# Patient Record
Sex: FEMALE | Race: WHITE | Employment: FULL TIME | URBAN - METROPOLITAN AREA
[De-identification: names, ages, dates, MRNs, and addresses within clinical notes are randomized per-mention and may not be internally consistent; named-entity substitution may affect disease eponyms.]

---

## 2017-06-14 ENCOUNTER — TELEPHONE (OUTPATIENT)
Dept: INTERNAL MEDICINE CLINIC | Facility: CLINIC | Age: 47
End: 2017-06-14

## 2017-06-14 DIAGNOSIS — E55.9 VITAMIN D DEFICIENCY: ICD-10-CM

## 2017-06-14 DIAGNOSIS — Z00.00 ANNUAL PHYSICAL EXAM: Primary | ICD-10-CM

## 2017-06-14 DIAGNOSIS — E78.00 HYPERCHOLESTEROLEMIA: ICD-10-CM

## 2017-06-14 DIAGNOSIS — D50.0 IRON DEFICIENCY ANEMIA DUE TO CHRONIC BLOOD LOSS: ICD-10-CM

## 2017-06-14 RX ORDER — VENLAFAXINE HYDROCHLORIDE 75 MG/1
CAPSULE, EXTENDED RELEASE ORAL
Qty: 90 CAPSULE | Refills: 0 | Status: SHIPPED | OUTPATIENT
Start: 2017-06-14 | End: 2017-09-12

## 2017-06-14 NOTE — TELEPHONE ENCOUNTER
Lab order pending per protocol. To  to please review. Should Ferritin, iron and TIBC be added to lab order?

## 2017-06-14 NOTE — TELEPHONE ENCOUNTER
Pt scheduled for physical on 8/10/17  Pt will go for labs prior  Please be sure order in the system  Tasked to nursing

## 2017-06-26 RX ORDER — SIMVASTATIN 10 MG
TABLET ORAL
Qty: 90 TABLET | Refills: 0 | Status: SHIPPED | OUTPATIENT
Start: 2017-06-26 | End: 2017-09-24

## 2017-08-04 ENCOUNTER — APPOINTMENT (OUTPATIENT)
Dept: LAB | Age: 47
End: 2017-08-04
Attending: INTERNAL MEDICINE
Payer: COMMERCIAL

## 2017-08-04 LAB
ALT SERPL-CCNC: 17 U/L (ref 14–54)
ANION GAP SERPL CALC-SCNC: 6 MMOL/L (ref 0–18)
AST SERPL-CCNC: 25 U/L (ref 15–41)
BASOPHILS # BLD: 0.1 K/UL (ref 0–0.2)
BASOPHILS NFR BLD: 1 %
BUN SERPL-MCNC: 10 MG/DL (ref 8–20)
BUN/CREAT SERPL: 12.2 (ref 10–20)
CALCIUM SERPL-MCNC: 9.6 MG/DL (ref 8.5–10.5)
CHLORIDE SERPL-SCNC: 100 MMOL/L (ref 95–110)
CHOLEST SERPL-MCNC: 177 MG/DL (ref 110–200)
CO2 SERPL-SCNC: 31 MMOL/L (ref 22–32)
CREAT SERPL-MCNC: 0.82 MG/DL (ref 0.5–1.5)
EOSINOPHIL # BLD: 0.2 K/UL (ref 0–0.7)
EOSINOPHIL NFR BLD: 3 %
ERYTHROCYTE [DISTWIDTH] IN BLOOD BY AUTOMATED COUNT: 13.8 % (ref 11–15)
FERRITIN SERPL IA-MCNC: 63 NG/ML (ref 11–307)
GLUCOSE SERPL-MCNC: 99 MG/DL (ref 70–99)
HCT VFR BLD AUTO: 39.3 % (ref 35–48)
HDLC SERPL-MCNC: 56 MG/DL
HGB BLD-MCNC: 13.2 G/DL (ref 12–16)
IRON SATN MFR SERPL: 31 % (ref 15–50)
IRON SERPL-MCNC: 95 MCG/DL (ref 28–170)
LDLC SERPL CALC-MCNC: 94 MG/DL (ref 0–99)
LYMPHOCYTES # BLD: 1.3 K/UL (ref 1–4)
LYMPHOCYTES NFR BLD: 19 %
MCH RBC QN AUTO: 31.8 PG (ref 27–32)
MCHC RBC AUTO-ENTMCNC: 33.6 G/DL (ref 32–37)
MCV RBC AUTO: 94.5 FL (ref 80–100)
MONOCYTES # BLD: 0.5 K/UL (ref 0–1)
MONOCYTES NFR BLD: 8 %
NEUTROPHILS # BLD AUTO: 4.7 K/UL (ref 1.8–7.7)
NEUTROPHILS NFR BLD: 69 %
NONHDLC SERPL-MCNC: 121 MG/DL
OSMOLALITY UR CALC.SUM OF ELEC: 283 MOSM/KG (ref 275–295)
PLATELET # BLD AUTO: 277 K/UL (ref 140–400)
PMV BLD AUTO: 8.5 FL (ref 7.4–10.3)
POTASSIUM SERPL-SCNC: 4.2 MMOL/L (ref 3.3–5.1)
RBC # BLD AUTO: 4.16 M/UL (ref 3.7–5.4)
SODIUM SERPL-SCNC: 137 MMOL/L (ref 136–144)
TIBC SERPL-MCNC: 309 MCG/DL (ref 228–428)
TRANSFERRIN SERPL-MCNC: 234 MG/DL (ref 192–382)
TRIGL SERPL-MCNC: 136 MG/DL (ref 1–149)
TSH SERPL-ACNC: 1.67 UIU/ML (ref 0.45–5.33)
WBC # BLD AUTO: 6.8 K/UL (ref 4–11)

## 2017-08-04 PROCEDURE — 82728 ASSAY OF FERRITIN: CPT | Performed by: INTERNAL MEDICINE

## 2017-08-04 PROCEDURE — 85025 COMPLETE CBC W/AUTO DIFF WBC: CPT | Performed by: INTERNAL MEDICINE

## 2017-08-04 PROCEDURE — 83540 ASSAY OF IRON: CPT | Performed by: INTERNAL MEDICINE

## 2017-08-04 PROCEDURE — 84460 ALANINE AMINO (ALT) (SGPT): CPT | Performed by: INTERNAL MEDICINE

## 2017-08-04 PROCEDURE — 84450 TRANSFERASE (AST) (SGOT): CPT | Performed by: INTERNAL MEDICINE

## 2017-08-04 PROCEDURE — 80048 BASIC METABOLIC PNL TOTAL CA: CPT | Performed by: INTERNAL MEDICINE

## 2017-08-04 PROCEDURE — 84466 ASSAY OF TRANSFERRIN: CPT | Performed by: INTERNAL MEDICINE

## 2017-08-04 PROCEDURE — 82306 VITAMIN D 25 HYDROXY: CPT | Performed by: INTERNAL MEDICINE

## 2017-08-04 PROCEDURE — 80061 LIPID PANEL: CPT | Performed by: INTERNAL MEDICINE

## 2017-08-04 PROCEDURE — 84443 ASSAY THYROID STIM HORMONE: CPT | Performed by: INTERNAL MEDICINE

## 2017-08-07 LAB — 25(OH)D3 SERPL-MCNC: 35.4 NG/ML

## 2017-08-10 ENCOUNTER — OFFICE VISIT (OUTPATIENT)
Dept: INTERNAL MEDICINE CLINIC | Facility: CLINIC | Age: 47
End: 2017-08-10

## 2017-08-10 VITALS
HEIGHT: 63 IN | BODY MASS INDEX: 28.7 KG/M2 | DIASTOLIC BLOOD PRESSURE: 80 MMHG | SYSTOLIC BLOOD PRESSURE: 132 MMHG | TEMPERATURE: 98 F | WEIGHT: 162 LBS | HEART RATE: 72 BPM

## 2017-08-10 DIAGNOSIS — Z12.31 ENCOUNTER FOR SCREENING MAMMOGRAM FOR BREAST CANCER: ICD-10-CM

## 2017-08-10 DIAGNOSIS — E55.9 VITAMIN D DEFICIENCY: ICD-10-CM

## 2017-08-10 DIAGNOSIS — Z00.00 ROUTINE HEALTH MAINTENANCE: ICD-10-CM

## 2017-08-10 DIAGNOSIS — E78.00 HYPERCHOLESTEROLEMIA: Primary | ICD-10-CM

## 2017-08-10 PROCEDURE — 99396 PREV VISIT EST AGE 40-64: CPT | Performed by: INTERNAL MEDICINE

## 2017-08-10 NOTE — PROGRESS NOTES
Anna Cabral is a 51year old female. Patient presents with:  Physical: Patient is here today for a PE. She is due for pap exam in 2019. Patient states that she has been feeling good lately. No new issues.        HPI:   Anna Cabral is a 51year old disorder     per NG (EMA) : sinus tach symptomatic      Past Surgical History:  06-28-21013: COLONOSCOPY  No date: D & C  No date: OTHER SURGICAL HISTORY      Comment: complications after giving birth   Family History   Problem Relation Age of Onset   • Alton Carver given for repeat. Pap/HPV done in 4/2014 -- next in 2019. Continue exercise. Sees derm for annual exam.  Discussed healthy diet -- limit carbs.      2. Hypercholesterolemia  Lipids at goal on Zocor     3.  Iron deficiency anemia (heavy menses)  Menses st

## 2017-09-12 RX ORDER — VENLAFAXINE HYDROCHLORIDE 75 MG/1
CAPSULE, EXTENDED RELEASE ORAL
Qty: 90 CAPSULE | Refills: 3 | Status: SHIPPED | OUTPATIENT
Start: 2017-09-12 | End: 2018-09-09

## 2017-09-18 ENCOUNTER — HOSPITAL ENCOUNTER (OUTPATIENT)
Dept: MAMMOGRAPHY | Age: 47
Discharge: HOME | End: 2017-09-18
Attending: INTERNAL MEDICINE
Payer: COMMERCIAL

## 2017-09-18 DIAGNOSIS — Z12.31 ENCOUNTER FOR SCREENING MAMMOGRAM FOR BREAST CANCER: ICD-10-CM

## 2017-09-18 PROCEDURE — 77067 SCR MAMMO BI INCL CAD: CPT | Performed by: INTERNAL MEDICINE

## 2017-09-25 RX ORDER — SIMVASTATIN 10 MG
TABLET ORAL
Qty: 90 TABLET | Refills: 3 | Status: SHIPPED | OUTPATIENT
Start: 2017-09-25 | End: 2018-09-20

## 2018-03-10 ENCOUNTER — HOSPITAL ENCOUNTER (OUTPATIENT)
Age: 48
Discharge: HOME | End: 2018-03-10
Attending: FAMILY MEDICINE
Payer: COMMERCIAL

## 2018-03-10 VITALS
WEIGHT: 165 LBS | SYSTOLIC BLOOD PRESSURE: 137 MMHG | DIASTOLIC BLOOD PRESSURE: 88 MMHG | OXYGEN SATURATION: 96 % | HEIGHT: 62 IN | HEART RATE: 74 BPM | BODY MASS INDEX: 30.36 KG/M2 | TEMPERATURE: 99 F | RESPIRATION RATE: 18 BRPM

## 2018-03-10 DIAGNOSIS — J11.1 INFLUENZA: Primary | ICD-10-CM

## 2018-03-10 LAB — S PYO AG THROAT QL: NEGATIVE

## 2018-03-10 PROCEDURE — 99204 OFFICE O/P NEW MOD 45 MIN: CPT

## 2018-03-10 PROCEDURE — 87430 STREP A AG IA: CPT

## 2018-03-10 PROCEDURE — 99203 OFFICE O/P NEW LOW 30 MIN: CPT

## 2018-03-10 RX ORDER — OSELTAMIVIR PHOSPHATE 75 MG/1
75 CAPSULE ORAL 2 TIMES DAILY
Qty: 10 CAPSULE | Refills: 0 | Status: SHIPPED | OUTPATIENT
Start: 2018-03-10 | End: 2018-03-15

## 2018-03-10 NOTE — ED INITIAL ASSESSMENT (HPI)
REPORTS COUGH, CHILLS, BODY ACHES SINCE Thursday EVENING. PATIENT ALSO C/O HEADACHE AND EAR CONGESTION. DENIES FEVERS AT HOME. HOWEVER STATES  DIAGNOSED WITH \"THE FLU\" EARLIER THIS WEEK AND TESTED POSITIVE FOR INFLUENZA A.

## 2018-03-10 NOTE — ED PROVIDER NOTES
Patient presents with:  Cough/URI    HPI:     Therese Foster is a 49year old female who presents with for chief complaint of fevers, chills, chest congestion, cough, headaches and body aches.   Onset of symptoms was 1 day  The patient denies complaints o 40's       Smoking status: Former Smoker                                                              Packs/day: 0.00      Years: 0.00      Smokeless tobacco: Never Used                      Alcohol use: Yes           1.5 oz/week     Glasses of wine: 3 per total) by mouth 2 (two) times daily. Dispense:  10 capsule          Refill:  0    Steam inhalation. Monitor fluid intake and urine output. Push fluids.    Motrin/tylenol for fever  OTC cough/cold meds for symptomatic relief  Monitor for worsening

## 2018-06-22 ENCOUNTER — LAB ENCOUNTER (OUTPATIENT)
Dept: LAB | Age: 48
End: 2018-06-22
Attending: ALLERGY & IMMUNOLOGY
Payer: COMMERCIAL

## 2018-06-22 DIAGNOSIS — L50.9 HIVES: Primary | ICD-10-CM

## 2018-06-22 PROCEDURE — 86160 COMPLEMENT ANTIGEN: CPT

## 2018-06-22 PROCEDURE — 85652 RBC SED RATE AUTOMATED: CPT

## 2018-06-22 PROCEDURE — 86332 IMMUNE COMPLEX ASSAY: CPT

## 2018-06-22 PROCEDURE — 81001 URINALYSIS AUTO W/SCOPE: CPT

## 2018-06-22 PROCEDURE — 86038 ANTINUCLEAR ANTIBODIES: CPT

## 2018-06-22 PROCEDURE — 86431 RHEUMATOID FACTOR QUANT: CPT

## 2018-06-22 PROCEDURE — 84443 ASSAY THYROID STIM HORMONE: CPT

## 2018-06-22 PROCEDURE — 80053 COMPREHEN METABOLIC PANEL: CPT

## 2018-06-22 PROCEDURE — 86162 COMPLEMENT TOTAL (CH50): CPT

## 2018-06-22 PROCEDURE — 36415 COLL VENOUS BLD VENIPUNCTURE: CPT

## 2018-06-22 PROCEDURE — 84439 ASSAY OF FREE THYROXINE: CPT

## 2018-06-27 ENCOUNTER — LAB ENCOUNTER (OUTPATIENT)
Dept: LAB | Age: 48
End: 2018-06-27
Attending: ALLERGY & IMMUNOLOGY
Payer: COMMERCIAL

## 2018-06-27 DIAGNOSIS — L50.9 HIVES: ICD-10-CM

## 2018-06-27 PROCEDURE — 36415 COLL VENOUS BLD VENIPUNCTURE: CPT

## 2018-06-27 PROCEDURE — 85025 COMPLETE CBC W/AUTO DIFF WBC: CPT

## 2018-07-26 ENCOUNTER — HOSPITAL ENCOUNTER (OUTPATIENT)
Dept: MAMMOGRAPHY | Age: 48
Discharge: HOME | End: 2018-07-26
Attending: INTERNAL MEDICINE
Payer: COMMERCIAL

## 2018-07-26 ENCOUNTER — LAB ENCOUNTER (OUTPATIENT)
Dept: LAB | Age: 48
End: 2018-07-26
Attending: INTERNAL MEDICINE
Payer: COMMERCIAL

## 2018-07-26 DIAGNOSIS — Z12.31 ENCOUNTER FOR SCREENING MAMMOGRAM FOR BREAST CANCER: ICD-10-CM

## 2018-07-26 DIAGNOSIS — L50.9 HIVES: Primary | ICD-10-CM

## 2018-07-26 LAB
BACTERIA UR QL AUTO: NEGATIVE /HPF
BILIRUB UR QL: NEGATIVE
CLARITY UR: CLEAR
COLOR UR: YELLOW
GLUCOSE UR-MCNC: NEGATIVE MG/DL
KETONES UR-MCNC: NEGATIVE MG/DL
LEUKOCYTE ESTERASE UR QL STRIP.AUTO: NEGATIVE
NITRITE UR QL STRIP.AUTO: NEGATIVE
PH UR: 5 [PH] (ref 5–8)
PROT UR-MCNC: NEGATIVE MG/DL
RBC #/AREA URNS AUTO: <1 /HPF
SP GR UR STRIP: 1.01 (ref 1–1.03)
UROBILINOGEN UR STRIP-ACNC: <2
VIT C UR-MCNC: NEGATIVE MG/DL
WBC #/AREA URNS AUTO: <1 /HPF

## 2018-07-26 PROCEDURE — 81001 URINALYSIS AUTO W/SCOPE: CPT

## 2018-07-26 PROCEDURE — 77067 SCR MAMMO BI INCL CAD: CPT | Performed by: INTERNAL MEDICINE

## 2018-08-16 ENCOUNTER — OFFICE VISIT (OUTPATIENT)
Dept: INTERNAL MEDICINE CLINIC | Facility: CLINIC | Age: 48
End: 2018-08-16
Payer: COMMERCIAL

## 2018-08-16 VITALS
DIASTOLIC BLOOD PRESSURE: 78 MMHG | HEIGHT: 62 IN | SYSTOLIC BLOOD PRESSURE: 130 MMHG | HEART RATE: 68 BPM | BODY MASS INDEX: 30.91 KG/M2 | TEMPERATURE: 98 F | WEIGHT: 168 LBS

## 2018-08-16 DIAGNOSIS — Z83.71 FAMILY HISTORY OF COLONIC POLYPS: ICD-10-CM

## 2018-08-16 DIAGNOSIS — E78.00 HYPERCHOLESTEROLEMIA: ICD-10-CM

## 2018-08-16 DIAGNOSIS — Z12.4 SCREENING FOR CERVICAL CANCER: ICD-10-CM

## 2018-08-16 DIAGNOSIS — E55.9 VITAMIN D DEFICIENCY: ICD-10-CM

## 2018-08-16 DIAGNOSIS — Z00.00 ROUTINE HEALTH MAINTENANCE: Primary | ICD-10-CM

## 2018-08-16 PROCEDURE — 99396 PREV VISIT EST AGE 40-64: CPT | Performed by: INTERNAL MEDICINE

## 2018-08-16 NOTE — PROGRESS NOTES
Gerry Aranda is a 49year old female. Patient presents with:  Physical      HPI:   Gerry Aranda is a 49year old female who presents for a complete physical exam.   Has been seeing allergist Dr. Purnima kaplan. Had labs done.   Plays tennis and does Cancer Other    • Arthritis Other    • Heart Disease Other    • Neurological Disorder Other    • Breast Cancer Paternal Aunt      63's   • Breast Cancer Maternal Cousin Female      42's      Social History:   Smoking status: Former Smoker Family hx of colon polyps (dad) and colon cancer (paternal aunt)  Pt had colonoscopy 6/28/13 (no polyps) and recently 7/25/18 (no polyps) with Dr. Esteban Hernandez. 5. Chronic constipation  Dr. Esteban Hernandez added miralax to her Benefiber.     RTC 1 yr.       Merlinda Sorenson

## 2018-08-20 LAB — HPV I/H RISK 1 DNA SPEC QL NAA+PROBE: NEGATIVE

## 2018-09-10 NOTE — TELEPHONE ENCOUNTER
Dr Nasir Anderson do you want Pt to continue with Med?  No mention in last OV note, just checking, thanks

## 2018-09-12 RX ORDER — VENLAFAXINE HYDROCHLORIDE 75 MG/1
CAPSULE, EXTENDED RELEASE ORAL
Qty: 90 CAPSULE | Refills: 3 | Status: SHIPPED | OUTPATIENT
Start: 2018-09-12 | End: 2019-08-22

## 2018-09-20 RX ORDER — SIMVASTATIN 10 MG
TABLET ORAL
Qty: 90 TABLET | Refills: 0 | Status: SHIPPED | OUTPATIENT
Start: 2018-09-20 | End: 2018-12-18

## 2018-11-06 ENCOUNTER — MED REC SCAN ONLY (OUTPATIENT)
Dept: INTERNAL MEDICINE CLINIC | Facility: CLINIC | Age: 48
End: 2018-11-06

## 2018-12-06 ENCOUNTER — TELEPHONE (OUTPATIENT)
Dept: INTERNAL MEDICINE CLINIC | Facility: CLINIC | Age: 48
End: 2018-12-06

## 2018-12-07 NOTE — TELEPHONE ENCOUNTER
Please let pt know that I received the UA results from her allergist Dr. Melvi Russell. While the dipstick was positive for a small amount of blood, the microscopic portion only showed 0-2 RBC's, which is normal.  No further w/u needed.

## 2018-12-07 NOTE — TELEPHONE ENCOUNTER
Left voicemail relaying the message per HIPAA. Encouraged the patient to call back with any questions or concerns.

## 2018-12-15 ENCOUNTER — APPOINTMENT (OUTPATIENT)
Dept: LAB | Age: 48
End: 2018-12-15
Attending: INTERNAL MEDICINE
Payer: COMMERCIAL

## 2018-12-15 DIAGNOSIS — E55.9 VITAMIN D DEFICIENCY: ICD-10-CM

## 2018-12-15 DIAGNOSIS — E78.00 HYPERCHOLESTEROLEMIA: ICD-10-CM

## 2018-12-15 PROCEDURE — 36415 COLL VENOUS BLD VENIPUNCTURE: CPT

## 2018-12-15 PROCEDURE — 80061 LIPID PANEL: CPT

## 2018-12-15 PROCEDURE — 82306 VITAMIN D 25 HYDROXY: CPT

## 2018-12-19 RX ORDER — SIMVASTATIN 10 MG
10 TABLET ORAL NIGHTLY
Qty: 90 TABLET | Refills: 3 | Status: SHIPPED | OUTPATIENT
Start: 2018-12-19 | End: 2019-08-22

## 2019-05-30 NOTE — TELEPHONE ENCOUNTER
#90 with 3 sent to [facility] 9/12/18    Refill request received from [facility] [place]. LMTCB: please ask pt if she would like [facility] or new rx to [facility]?

## 2019-06-10 RX ORDER — VENLAFAXINE HYDROCHLORIDE 75 MG/1
CAPSULE, EXTENDED RELEASE ORAL
Qty: 90 CAPSULE | Refills: 0 | OUTPATIENT
Start: 2019-06-10

## 2019-07-31 ENCOUNTER — TELEPHONE (OUTPATIENT)
Dept: INTERNAL MEDICINE CLINIC | Facility: CLINIC | Age: 49
End: 2019-07-31

## 2019-07-31 DIAGNOSIS — Z12.39 SCREENING FOR BREAST CANCER: ICD-10-CM

## 2019-07-31 DIAGNOSIS — E78.00 HYPERCHOLESTEROLEMIA: ICD-10-CM

## 2019-07-31 DIAGNOSIS — R53.83 OTHER FATIGUE: ICD-10-CM

## 2019-07-31 DIAGNOSIS — D50.0 IRON DEFICIENCY ANEMIA DUE TO CHRONIC BLOOD LOSS: ICD-10-CM

## 2019-07-31 DIAGNOSIS — Z00.00 ANNUAL PHYSICAL EXAM: Primary | ICD-10-CM

## 2019-07-31 NOTE — TELEPHONE ENCOUNTER
Please advise on labs for annual physical , also order for mammogram thanks - to DR. Christine Connors

## 2019-07-31 NOTE — TELEPHONE ENCOUNTER
Pt is scheduled on 8/22 for a yearly physical, pt is asking for an order for lab work & a mammogram  Please call pt when complete [phone]    Tasked to nursing

## 2019-08-01 NOTE — TELEPHONE ENCOUNTER
Called patient and relayed on VM per hipaa that fasting lab orders placed, fast for 12 hours prior, water ok. Also relayed that mammogram order placed and gave phone number for central scheduling. Instructed to call back if any further questions.

## 2019-08-10 ENCOUNTER — LAB ENCOUNTER (OUTPATIENT)
Dept: LAB | Age: 49
End: 2019-08-10
Attending: INTERNAL MEDICINE
Payer: COMMERCIAL

## 2019-08-10 DIAGNOSIS — R53.83 OTHER FATIGUE: ICD-10-CM

## 2019-08-10 DIAGNOSIS — E78.00 HYPERCHOLESTEROLEMIA: ICD-10-CM

## 2019-08-10 DIAGNOSIS — Z00.00 ANNUAL PHYSICAL EXAM: ICD-10-CM

## 2019-08-10 LAB
ALBUMIN SERPL-MCNC: 3.9 G/DL (ref 3.4–5)
ALBUMIN/GLOB SERPL: 1.1 {RATIO} (ref 1–2)
ALP LIVER SERPL-CCNC: 55 U/L (ref 39–100)
ALT SERPL-CCNC: 20 U/L (ref 13–56)
ANION GAP SERPL CALC-SCNC: 7 MMOL/L (ref 0–18)
AST SERPL-CCNC: 21 U/L (ref 15–37)
BASOPHILS # BLD AUTO: 0.06 X10(3) UL (ref 0–0.2)
BASOPHILS NFR BLD AUTO: 1.1 %
BILIRUB SERPL-MCNC: 0.5 MG/DL (ref 0.1–2)
BUN BLD-MCNC: 16 MG/DL (ref 7–18)
BUN/CREAT SERPL: 21.9 (ref 10–20)
CALCIUM BLD-MCNC: 9.4 MG/DL (ref 8.5–10.1)
CHLORIDE SERPL-SCNC: 104 MMOL/L (ref 98–112)
CHOLEST SMN-MCNC: 221 MG/DL (ref ?–200)
CO2 SERPL-SCNC: 29 MMOL/L (ref 21–32)
CREAT BLD-MCNC: 0.73 MG/DL (ref 0.55–1.02)
DEPRECATED RDW RBC AUTO: 47.5 FL (ref 35.1–46.3)
EOSINOPHIL # BLD AUTO: 0.35 X10(3) UL (ref 0–0.7)
EOSINOPHIL NFR BLD AUTO: 6.3 %
ERYTHROCYTE [DISTWIDTH] IN BLOOD BY AUTOMATED COUNT: 13.2 % (ref 11–15)
GLOBULIN PLAS-MCNC: 3.6 G/DL (ref 2.8–4.4)
GLUCOSE BLD-MCNC: 95 MG/DL (ref 70–99)
HCT VFR BLD AUTO: 41.4 % (ref 35–48)
HDLC SERPL-MCNC: 68 MG/DL (ref 40–59)
HGB BLD-MCNC: 13.5 G/DL (ref 12–16)
IMM GRANULOCYTES # BLD AUTO: 0.04 X10(3) UL (ref 0–1)
IMM GRANULOCYTES NFR BLD: 0.7 %
LDLC SERPL CALC-MCNC: 136 MG/DL (ref ?–100)
LYMPHOCYTES # BLD AUTO: 1.53 X10(3) UL (ref 1–4)
LYMPHOCYTES NFR BLD AUTO: 27.3 %
M PROTEIN MFR SERPL ELPH: 7.5 G/DL (ref 6.4–8.2)
MCH RBC QN AUTO: 31.3 PG (ref 26–34)
MCHC RBC AUTO-ENTMCNC: 32.6 G/DL (ref 31–37)
MCV RBC AUTO: 95.8 FL (ref 80–100)
MONOCYTES # BLD AUTO: 0.65 X10(3) UL (ref 0.1–1)
MONOCYTES NFR BLD AUTO: 11.6 %
NEUTROPHILS # BLD AUTO: 2.97 X10 (3) UL (ref 1.5–7.7)
NEUTROPHILS # BLD AUTO: 2.97 X10(3) UL (ref 1.5–7.7)
NEUTROPHILS NFR BLD AUTO: 53 %
NONHDLC SERPL-MCNC: 153 MG/DL (ref ?–130)
OSMOLALITY SERPL CALC.SUM OF ELEC: 291 MOSM/KG (ref 275–295)
PATIENT FASTING: YES
PATIENT FASTING: YES
PLATELET # BLD AUTO: 300 10(3)UL (ref 150–450)
POTASSIUM SERPL-SCNC: 4.3 MMOL/L (ref 3.5–5.1)
RBC # BLD AUTO: 4.32 X10(6)UL (ref 3.8–5.3)
SODIUM SERPL-SCNC: 140 MMOL/L (ref 136–145)
TRIGL SERPL-MCNC: 83 MG/DL (ref 30–149)
TSI SER-ACNC: 3.21 MIU/ML (ref 0.36–3.74)
VLDLC SERPL CALC-MCNC: 17 MG/DL (ref 0–30)
WBC # BLD AUTO: 5.6 X10(3) UL (ref 4–11)

## 2019-08-10 PROCEDURE — 80061 LIPID PANEL: CPT

## 2019-08-10 PROCEDURE — 36415 COLL VENOUS BLD VENIPUNCTURE: CPT

## 2019-08-10 PROCEDURE — 84443 ASSAY THYROID STIM HORMONE: CPT

## 2019-08-10 PROCEDURE — 80053 COMPREHEN METABOLIC PANEL: CPT

## 2019-08-10 PROCEDURE — 85025 COMPLETE CBC W/AUTO DIFF WBC: CPT

## 2019-08-22 ENCOUNTER — OFFICE VISIT (OUTPATIENT)
Dept: INTERNAL MEDICINE CLINIC | Facility: CLINIC | Age: 49
End: 2019-08-22
Payer: COMMERCIAL

## 2019-08-22 VITALS
TEMPERATURE: 98 F | BODY MASS INDEX: 27.97 KG/M2 | DIASTOLIC BLOOD PRESSURE: 78 MMHG | HEIGHT: 62 IN | HEART RATE: 54 BPM | RESPIRATION RATE: 16 BRPM | SYSTOLIC BLOOD PRESSURE: 116 MMHG | WEIGHT: 152 LBS | OXYGEN SATURATION: 99 %

## 2019-08-22 DIAGNOSIS — Z78.0 POSTMENOPAUSE: Primary | ICD-10-CM

## 2019-08-22 DIAGNOSIS — E78.00 HYPERCHOLESTEROLEMIA: ICD-10-CM

## 2019-08-22 DIAGNOSIS — Z00.00 ROUTINE HEALTH MAINTENANCE: ICD-10-CM

## 2019-08-22 DIAGNOSIS — E55.9 VITAMIN D DEFICIENCY: ICD-10-CM

## 2019-08-22 DIAGNOSIS — Z83.71 FAMILY HISTORY OF COLONIC POLYPS: ICD-10-CM

## 2019-08-22 PROCEDURE — 99396 PREV VISIT EST AGE 40-64: CPT | Performed by: INTERNAL MEDICINE

## 2019-08-22 PROCEDURE — 90715 TDAP VACCINE 7 YRS/> IM: CPT | Performed by: INTERNAL MEDICINE

## 2019-08-22 PROCEDURE — 90471 IMMUNIZATION ADMIN: CPT | Performed by: INTERNAL MEDICINE

## 2019-08-22 RX ORDER — SIMVASTATIN 10 MG
TABLET ORAL
Qty: 90 TABLET | Refills: 0 | OUTPATIENT
Start: 2019-08-22

## 2019-08-22 RX ORDER — SIMVASTATIN 10 MG
10 TABLET ORAL NIGHTLY
Qty: 90 TABLET | Refills: 3 | Status: SHIPPED | OUTPATIENT
Start: 2019-08-22 | End: 2020-09-15

## 2019-08-22 RX ORDER — VENLAFAXINE HYDROCHLORIDE 75 MG/1
75 CAPSULE, EXTENDED RELEASE ORAL
Qty: 90 CAPSULE | Refills: 3 | Status: SHIPPED | OUTPATIENT
Start: 2019-08-22 | End: 2019-11-26

## 2019-08-22 RX ORDER — VENLAFAXINE HYDROCHLORIDE 75 MG/1
CAPSULE, EXTENDED RELEASE ORAL
Qty: 90 CAPSULE | Refills: 0 | OUTPATIENT
Start: 2019-08-22

## 2019-08-22 NOTE — PROGRESS NOTES
Yvonne Mary is a 51year old female. Patient presents with:  Physical: Annual Px      HPI:   Yvonne Mary is a 51year old female who presents for a complete physical exam.     Doing keto diet -- has lost 20 lbs thus far over the past few months. Other    • Breast Cancer Paternal Aunt         63's   • Breast Cancer Maternal Cousin Female         42's      Social History:   Social History    Tobacco Use      Smoking status: Former Smoker      Smokeless tobacco: Never Used    Alcohol use:  Yes      Al polyps) and again on 7/25/18 (no polyps) with Dr. Eulalia Andres. Chronic constipation  Has seen GI, Dr. Eulalia Andres; sx improved with keto diet. Now just takes miralax and benefiber as needed.     RTC 1 yr.       Jacolyn Habermann, 08/22/19, 4:29 PM

## 2019-08-26 ENCOUNTER — HOSPITAL ENCOUNTER (OUTPATIENT)
Dept: MAMMOGRAPHY | Age: 49
Discharge: HOME | End: 2019-08-26
Attending: INTERNAL MEDICINE
Payer: COMMERCIAL

## 2019-08-26 DIAGNOSIS — Z12.39 SCREENING FOR BREAST CANCER: ICD-10-CM

## 2019-08-26 PROCEDURE — 77067 SCR MAMMO BI INCL CAD: CPT | Performed by: INTERNAL MEDICINE

## 2019-08-26 PROCEDURE — 77063 BREAST TOMOSYNTHESIS BI: CPT | Performed by: INTERNAL MEDICINE

## 2019-09-10 ENCOUNTER — HOSPITAL ENCOUNTER (OUTPATIENT)
Dept: ULTRASOUND IMAGING | Facility: HOSPITAL | Age: 49
Discharge: HOME | End: 2019-09-10
Attending: INTERNAL MEDICINE
Payer: COMMERCIAL

## 2019-09-10 ENCOUNTER — HOSPITAL ENCOUNTER (OUTPATIENT)
Dept: MAMMOGRAPHY | Facility: HOSPITAL | Age: 49
Discharge: HOME | End: 2019-09-10
Attending: INTERNAL MEDICINE
Payer: COMMERCIAL

## 2019-09-10 DIAGNOSIS — R92.8 ABNORMAL MAMMOGRAM: ICD-10-CM

## 2019-09-10 PROCEDURE — 77061 BREAST TOMOSYNTHESIS UNI: CPT | Performed by: INTERNAL MEDICINE

## 2019-09-10 PROCEDURE — 76642 ULTRASOUND BREAST LIMITED: CPT | Performed by: INTERNAL MEDICINE

## 2019-09-10 PROCEDURE — 77065 DX MAMMO INCL CAD UNI: CPT | Performed by: INTERNAL MEDICINE

## 2019-09-10 NOTE — IMAGING NOTE
This nurse introduced self and role of breast coordinator. Discussed recommended breast biopsy with patient. Pt was recommended by Dr Lynsey Jon to have a right breast ultrasound guided biopsy. She is  has 2 children 12 and 11.  Allergic to contrast DIAGNOSTIC CATEGORY 4A--SUSPICIOUS  (low suspicion for malignancy)     RECOMMENDATIONS:   ULTRASOUND-GUIDED CORE BIOPSY: RIGHT BREAST                         Dictated by (CST): Caroline Mack MD on 9/10/2019 at 15:32       Approved by (CST): Mynor Davis for this procedure. The US technician will use the ultrasound machine to locate the area in question that was seen on your previous breast imaging. The Radiologist will then inject a local numbing medication into the area.  This may burn and sting for sev communicated by their ordering physician unless otherwise indicated. Educated patient that once we receive an order from her physician  our radiology secretaries would be calling   to schedule the biopsy.

## 2019-09-11 ENCOUNTER — TELEPHONE (OUTPATIENT)
Dept: INTERNAL MEDICINE CLINIC | Facility: CLINIC | Age: 49
End: 2019-09-11

## 2019-09-11 NOTE — TELEPHONE ENCOUNTER
LM on pt's voicemail re mammo results -- radiologist apparently already discussed recs for biopsy with pt.   Order for bx faxed to radiology dept

## 2019-09-11 NOTE — TELEPHONE ENCOUNTER
Routed to Dr Octavio Ernandez - Please review [facility] Fax sent to us regarding Marysol's recent Mammogram (On your desk :))

## 2019-09-18 ENCOUNTER — HOSPITAL ENCOUNTER (OUTPATIENT)
Dept: ULTRASOUND IMAGING | Facility: HOSPITAL | Age: 49
Discharge: HOME | End: 2019-09-18
Attending: INTERNAL MEDICINE
Payer: COMMERCIAL

## 2019-09-18 ENCOUNTER — HOSPITAL ENCOUNTER (OUTPATIENT)
Dept: MAMMOGRAPHY | Facility: HOSPITAL | Age: 49
Discharge: HOME | End: 2019-09-18
Attending: INTERNAL MEDICINE
Payer: COMMERCIAL

## 2019-09-18 VITALS — DIASTOLIC BLOOD PRESSURE: 74 MMHG | HEART RATE: 60 BPM | SYSTOLIC BLOOD PRESSURE: 121 MMHG

## 2019-09-18 DIAGNOSIS — N63.10 BREAST MASS, RIGHT: ICD-10-CM

## 2019-09-18 PROCEDURE — 88305 TISSUE EXAM BY PATHOLOGIST: CPT | Performed by: INTERNAL MEDICINE

## 2019-09-18 PROCEDURE — 88360 TUMOR IMMUNOHISTOCHEM/MANUAL: CPT | Performed by: INTERNAL MEDICINE

## 2019-09-18 PROCEDURE — 77065 DX MAMMO INCL CAD UNI: CPT | Performed by: INTERNAL MEDICINE

## 2019-09-18 PROCEDURE — 19083 BX BREAST 1ST LESION US IMAG: CPT | Performed by: INTERNAL MEDICINE

## 2019-09-18 NOTE — PROCEDURES
[facility]  Procedure Note    [place] Patient Status:  Outpatient    10/17/1970 MRN [id number]   Location [address] [facility] Attending Freddie Scott MD   Hosp Day # 0 PCP Robinson Cervantes MD     Procedure:

## 2019-09-19 ENCOUNTER — TELEPHONE (OUTPATIENT)
Dept: INTERNAL MEDICINE CLINIC | Facility: CLINIC | Age: 49
End: 2019-09-19

## 2019-09-19 NOTE — IMAGING NOTE
8770:  Attempt to contact Mrs. Flaco Christie at this time: follow-up post Ultrasound Guided Right Breast Biopsy. Voice message left requesting return call.

## 2019-09-20 NOTE — TELEPHONE ENCOUNTER
Please call pt to ensure she got my Mychart message about her benign breast biopsy and repeat mammo in 6 mos.

## 2019-11-26 ENCOUNTER — TELEPHONE (OUTPATIENT)
Dept: INTERNAL MEDICINE CLINIC | Facility: CLINIC | Age: 49
End: 2019-11-26

## 2019-11-26 RX ORDER — VENLAFAXINE HYDROCHLORIDE 75 MG/1
75 CAPSULE, EXTENDED RELEASE ORAL
Qty: 30 CAPSULE | Refills: 0 | Status: SHIPPED | OUTPATIENT
Start: 2019-11-26 | End: 2019-12-30

## 2019-11-26 NOTE — TELEPHONE ENCOUNTER
Pt is calling to request a 30 day supply of Venlafaxine 75 mg  There was a mix up with the auto refill at her mail order, they will be getting the medication to her but it will take time  Pt is out of medication    Tasked to rx low

## 2019-11-26 NOTE — TELEPHONE ENCOUNTER
Short term fill requested to [facility].     Refill request is for a maintenance medication and has met the criteria specified in the Ambulatory Medication Refill Standing Order for eligibility, visits, laboratory, alerts and was sent to the requested pharma

## 2019-12-23 RX ORDER — VENLAFAXINE HYDROCHLORIDE 75 MG/1
CAPSULE, EXTENDED RELEASE ORAL
Qty: 30 CAPSULE | Refills: 0 | OUTPATIENT
Start: 2019-12-23

## 2019-12-23 NOTE — TELEPHONE ENCOUNTER
Current refill request refused due to refill is either a duplicate request or has active refills at the pharmacy. Check previous templates.     Requested Prescriptions     Refused Prescriptions Disp Refills   • VENLAFAXINE HCL ER 75 MG Oral Capsule SR 24 H

## 2019-12-30 ENCOUNTER — TELEPHONE (OUTPATIENT)
Dept: INTERNAL MEDICINE CLINIC | Facility: CLINIC | Age: 49
End: 2019-12-30

## 2019-12-30 RX ORDER — VENLAFAXINE HYDROCHLORIDE 75 MG/1
75 CAPSULE, EXTENDED RELEASE ORAL
Qty: 30 CAPSULE | Refills: 0 | Status: SHIPPED | OUTPATIENT
Start: 2019-12-30 | End: 2020-07-29

## 2019-12-30 NOTE — TELEPHONE ENCOUNTER
Pt requesting refill for:  Venlafaxine, needs short term, mail order has not arrived yet  Mail order should arrive later this week  Pt uses [facility]  Tasked to [employer]

## 2020-01-02 ENCOUNTER — HOSPITAL ENCOUNTER (OUTPATIENT)
Age: 50
Discharge: HOME | End: 2020-01-02
Attending: EMERGENCY MEDICINE
Payer: COMMERCIAL

## 2020-01-02 VITALS
SYSTOLIC BLOOD PRESSURE: 135 MMHG | RESPIRATION RATE: 18 BRPM | OXYGEN SATURATION: 98 % | HEART RATE: 68 BPM | DIASTOLIC BLOOD PRESSURE: 74 MMHG | BODY MASS INDEX: 27 KG/M2 | TEMPERATURE: 99 F | WEIGHT: 145 LBS

## 2020-01-02 DIAGNOSIS — J02.0 STREP PHARYNGITIS: Primary | ICD-10-CM

## 2020-01-02 LAB — S PYO AG THROAT QL: POSITIVE

## 2020-01-02 PROCEDURE — 99214 OFFICE O/P EST MOD 30 MIN: CPT

## 2020-01-02 PROCEDURE — 99213 OFFICE O/P EST LOW 20 MIN: CPT

## 2020-01-02 PROCEDURE — 87430 STREP A AG IA: CPT

## 2020-01-02 RX ORDER — AMOXICILLIN 875 MG/1
875 TABLET, COATED ORAL 2 TIMES DAILY
Qty: 20 TABLET | Refills: 0 | Status: SHIPPED | OUTPATIENT
Start: 2020-01-02 | End: 2020-01-12

## 2020-01-02 NOTE — ED PROVIDER NOTES
Patient Seen in: [facility]      History   Patient presents with:  Sore Throat    Stated Complaint: SORE THROAT COUGH    HPI  Sore throat for 2 days. No fevers or chills. No chest pain shortness of breath.   No headache membrane and external ear normal.      Left Ear: Tympanic membrane and external ear normal.      Nose: No congestion or rhinorrhea. Mouth/Throat:      Mouth: Mucous membranes are moist.      Pharynx: Posterior oropharyngeal erythema present.  No oropha 0

## 2020-01-13 ENCOUNTER — TELEPHONE (OUTPATIENT)
Dept: INTERNAL MEDICINE CLINIC | Facility: CLINIC | Age: 50
End: 2020-01-13

## 2020-01-13 NOTE — TELEPHONE ENCOUNTER
----- Message from Sherle Sever sent at 1/13/2020  9:25 AM CST -----  Regarding: Other  Contact: [phone]  Dr. Alvaro Perales,     I am applying for [facility] to stay home with my mom, Suresh Marquez.   I know you're also completing one for my brother, Devang Todd

## 2020-01-15 ENCOUNTER — PATIENT MESSAGE (OUTPATIENT)
Dept: INTERNAL MEDICINE CLINIC | Facility: CLINIC | Age: 50
End: 2020-01-15

## 2020-01-15 ENCOUNTER — MED REC SCAN ONLY (OUTPATIENT)
Dept: INTERNAL MEDICINE CLINIC | Facility: CLINIC | Age: 50
End: 2020-01-15

## 2020-01-15 NOTE — TELEPHONE ENCOUNTER
From: Karyn Little  To: Salvador Addison MD  Sent: 1/15/2020 11:29 AM CST  Subject: Other    Dr. Landon Grider,    I got your message. Can you write a short letter on letterhead with my mom's diagnosis and the reason for hospice?  I think that should be enou

## 2020-01-15 NOTE — TELEPHONE ENCOUNTER
Jerson Moran and asked her to clarify what type of documentation is needed -- eg, progress note, letter, etc

## 2020-01-27 RX ORDER — VENLAFAXINE HYDROCHLORIDE 75 MG/1
CAPSULE, EXTENDED RELEASE ORAL
Qty: 30 CAPSULE | Refills: 0 | OUTPATIENT
Start: 2020-01-27

## 2020-01-27 NOTE — TELEPHONE ENCOUNTER
Refilled for a year supply to [facility] mail order on 8/22/19    Current refill request refused due to refill is either a duplicate request or has active refills at the pharmacy. Check previous templates.     Requested Prescriptions     Re

## 2020-02-03 ENCOUNTER — HOSPITAL ENCOUNTER (OUTPATIENT)
Age: 50
Discharge: HOME | End: 2020-02-03
Payer: COMMERCIAL

## 2020-02-03 VITALS
DIASTOLIC BLOOD PRESSURE: 77 MMHG | WEIGHT: 140 LBS | HEART RATE: 102 BPM | HEIGHT: 62 IN | RESPIRATION RATE: 16 BRPM | TEMPERATURE: 102 F | OXYGEN SATURATION: 100 % | BODY MASS INDEX: 25.76 KG/M2 | SYSTOLIC BLOOD PRESSURE: 151 MMHG

## 2020-02-03 DIAGNOSIS — J02.0 STREPTOCOCCAL SORE THROAT: Primary | ICD-10-CM

## 2020-02-03 DIAGNOSIS — J11.1 INFLUENZA: ICD-10-CM

## 2020-02-03 LAB
POCT INFLUENZA A: POSITIVE
POCT INFLUENZA B: NEGATIVE
S PYO AG THROAT QL: POSITIVE

## 2020-02-03 PROCEDURE — 87430 STREP A AG IA: CPT

## 2020-02-03 PROCEDURE — 87502 INFLUENZA DNA AMP PROBE: CPT | Performed by: NURSE PRACTITIONER

## 2020-02-03 PROCEDURE — 99214 OFFICE O/P EST MOD 30 MIN: CPT

## 2020-02-03 PROCEDURE — 99213 OFFICE O/P EST LOW 20 MIN: CPT

## 2020-02-03 RX ORDER — OSELTAMIVIR PHOSPHATE 75 MG/1
75 CAPSULE ORAL 2 TIMES DAILY
Qty: 10 CAPSULE | Refills: 0 | Status: SHIPPED | OUTPATIENT
Start: 2020-02-03 | End: 2020-02-08

## 2020-02-03 RX ORDER — CEFDINIR 300 MG/1
300 CAPSULE ORAL 2 TIMES DAILY
Qty: 20 CAPSULE | Refills: 0 | Status: SHIPPED | OUTPATIENT
Start: 2020-02-03 | End: 2020-02-13

## 2020-02-03 RX ORDER — IBUPROFEN 600 MG/1
600 TABLET ORAL ONCE
Status: COMPLETED | OUTPATIENT
Start: 2020-02-03 | End: 2020-02-03

## 2020-02-03 NOTE — ED INITIAL ASSESSMENT (HPI)
C/o 2 days of sore throat, headache, cough, and congestion. C/o fever and chills, body aches. No nausea.

## 2020-02-03 NOTE — ED PROVIDER NOTES
Patient presents with:  Cough/URI      HPI:     Kelsie Wright is a 47year old female with a history of anxiety, hyperlipidemia is with a chief complaint of sore throat, headache, cough, fever and chills as well as body aches over the course of last 2 d exam.  I discussed influenza results as well as rapid strep results. Patient is requesting Tamiflu. We had a long discussion about the side effects of the medication.   We discussed she probably will experience some diarrhea with taking the CARVAJAL EUGENIE and th

## 2020-04-09 ENCOUNTER — PATIENT MESSAGE (OUTPATIENT)
Dept: INTERNAL MEDICINE CLINIC | Facility: CLINIC | Age: 50
End: 2020-04-09

## 2020-04-09 RX ORDER — SIMVASTATIN 10 MG
10 TABLET ORAL NIGHTLY
Qty: 90 TABLET | Refills: 3 | Status: CANCELLED | OUTPATIENT
Start: 2020-04-09

## 2020-04-10 NOTE — TELEPHONE ENCOUNTER
From: Johanna Caldwell  To: Shelby Matta MD  Sent: 4/9/2020 7:46 PM CDT  Subject: Prescription Question    I put in a request for a prescription refill for Simvastatin. I typically receive my medications through [employer] [employer].  My order is

## 2020-06-12 ENCOUNTER — HOSPITAL ENCOUNTER (OUTPATIENT)
Dept: ULTRASOUND IMAGING | Facility: HOSPITAL | Age: 50
Discharge: HOME | End: 2020-06-12
Attending: INTERNAL MEDICINE
Payer: COMMERCIAL

## 2020-06-12 ENCOUNTER — HOSPITAL ENCOUNTER (OUTPATIENT)
Dept: MAMMOGRAPHY | Facility: HOSPITAL | Age: 50
Discharge: HOME | End: 2020-06-12
Attending: INTERNAL MEDICINE
Payer: COMMERCIAL

## 2020-06-12 DIAGNOSIS — N63.10 MASS OF BREAST, RIGHT: ICD-10-CM

## 2020-06-12 PROCEDURE — 76642 ULTRASOUND BREAST LIMITED: CPT | Performed by: INTERNAL MEDICINE

## 2020-06-12 PROCEDURE — 77065 DX MAMMO INCL CAD UNI: CPT | Performed by: INTERNAL MEDICINE

## 2020-06-12 PROCEDURE — 77061 BREAST TOMOSYNTHESIS UNI: CPT | Performed by: INTERNAL MEDICINE

## 2020-07-29 RX ORDER — VENLAFAXINE HYDROCHLORIDE 75 MG/1
CAPSULE, EXTENDED RELEASE ORAL
Qty: 90 CAPSULE | Refills: 0 | Status: SHIPPED | OUTPATIENT
Start: 2020-07-29 | End: 2020-09-15

## 2020-07-29 NOTE — TELEPHONE ENCOUNTER
Pt due for physical. Has appt scheduled. Short fill given.      Refill request is for a maintenance medication and has met the criteria specified in the Ambulatory Medication Refill Standing Order for eligibility, visits, laboratory, alerts and was sent to

## 2020-08-25 ENCOUNTER — TELEPHONE (OUTPATIENT)
Dept: INTERNAL MEDICINE CLINIC | Facility: CLINIC | Age: 50
End: 2020-08-25

## 2020-08-25 DIAGNOSIS — E55.9 VITAMIN D DEFICIENCY: ICD-10-CM

## 2020-08-25 DIAGNOSIS — E78.00 HYPERCHOLESTEROLEMIA: Primary | ICD-10-CM

## 2020-08-25 DIAGNOSIS — Z00.00 ROUTINE HEALTH MAINTENANCE: ICD-10-CM

## 2020-08-25 NOTE — TELEPHONE ENCOUNTER
Pt requesting in order for her yearly labs. She is using [facility] location.

## 2020-08-25 NOTE — TELEPHONE ENCOUNTER
Labs entered per protocol. Pt notified. Instructed to fast and call to schedule an appt with the lab.

## 2020-09-08 ENCOUNTER — LAB ENCOUNTER (OUTPATIENT)
Dept: LAB | Age: 50
End: 2020-09-08
Attending: INTERNAL MEDICINE
Payer: COMMERCIAL

## 2020-09-08 DIAGNOSIS — Z00.00 ROUTINE HEALTH MAINTENANCE: ICD-10-CM

## 2020-09-08 DIAGNOSIS — E55.9 VITAMIN D DEFICIENCY: ICD-10-CM

## 2020-09-08 DIAGNOSIS — E78.00 HYPERCHOLESTEROLEMIA: ICD-10-CM

## 2020-09-08 LAB
ALBUMIN SERPL-MCNC: 3.7 G/DL (ref 3.4–5)
ALBUMIN/GLOB SERPL: 1 {RATIO} (ref 1–2)
ALP LIVER SERPL-CCNC: 55 U/L (ref 39–100)
ALT SERPL-CCNC: 17 U/L (ref 13–56)
ANION GAP SERPL CALC-SCNC: 3 MMOL/L (ref 0–18)
AST SERPL-CCNC: 21 U/L (ref 15–37)
BASOPHILS # BLD AUTO: 0.06 X10(3) UL (ref 0–0.2)
BASOPHILS NFR BLD AUTO: 1.1 %
BILIRUB SERPL-MCNC: 0.5 MG/DL (ref 0.1–2)
BUN BLD-MCNC: 10 MG/DL (ref 7–18)
BUN/CREAT SERPL: 13.7 (ref 10–20)
CALCIUM BLD-MCNC: 9.5 MG/DL (ref 8.5–10.1)
CHLORIDE SERPL-SCNC: 103 MMOL/L (ref 98–112)
CHOLEST SMN-MCNC: 204 MG/DL (ref ?–200)
CO2 SERPL-SCNC: 32 MMOL/L (ref 21–32)
CREAT BLD-MCNC: 0.73 MG/DL (ref 0.55–1.02)
DEPRECATED RDW RBC AUTO: 46.4 FL (ref 35.1–46.3)
EOSINOPHIL # BLD AUTO: 0.29 X10(3) UL (ref 0–0.7)
EOSINOPHIL NFR BLD AUTO: 5.5 %
ERYTHROCYTE [DISTWIDTH] IN BLOOD BY AUTOMATED COUNT: 13.1 % (ref 11–15)
GLOBULIN PLAS-MCNC: 3.6 G/DL (ref 2.8–4.4)
GLUCOSE BLD-MCNC: 97 MG/DL (ref 70–99)
HCT VFR BLD AUTO: 40.8 % (ref 35–48)
HDLC SERPL-MCNC: 64 MG/DL (ref 40–59)
HGB BLD-MCNC: 13.5 G/DL (ref 12–16)
IMM GRANULOCYTES # BLD AUTO: 0.02 X10(3) UL (ref 0–1)
IMM GRANULOCYTES NFR BLD: 0.4 %
LDLC SERPL CALC-MCNC: 117 MG/DL (ref ?–100)
LYMPHOCYTES # BLD AUTO: 1.69 X10(3) UL (ref 1–4)
LYMPHOCYTES NFR BLD AUTO: 31.9 %
M PROTEIN MFR SERPL ELPH: 7.3 G/DL (ref 6.4–8.2)
MCH RBC QN AUTO: 31.7 PG (ref 26–34)
MCHC RBC AUTO-ENTMCNC: 33.1 G/DL (ref 31–37)
MCV RBC AUTO: 95.8 FL (ref 80–100)
MONOCYTES # BLD AUTO: 0.58 X10(3) UL (ref 0.1–1)
MONOCYTES NFR BLD AUTO: 10.9 %
NEUTROPHILS # BLD AUTO: 2.66 X10 (3) UL (ref 1.5–7.7)
NEUTROPHILS # BLD AUTO: 2.66 X10(3) UL (ref 1.5–7.7)
NEUTROPHILS NFR BLD AUTO: 50.2 %
NONHDLC SERPL-MCNC: 140 MG/DL (ref ?–130)
OSMOLALITY SERPL CALC.SUM OF ELEC: 285 MOSM/KG (ref 275–295)
PATIENT FASTING Y/N/NP: YES
PATIENT FASTING Y/N/NP: YES
PLATELET # BLD AUTO: 291 10(3)UL (ref 150–450)
POTASSIUM SERPL-SCNC: 4.1 MMOL/L (ref 3.5–5.1)
RBC # BLD AUTO: 4.26 X10(6)UL (ref 3.8–5.3)
SODIUM SERPL-SCNC: 138 MMOL/L (ref 136–145)
TRIGL SERPL-MCNC: 114 MG/DL (ref 30–149)
TSI SER-ACNC: 3.42 MIU/ML (ref 0.36–3.74)
VLDLC SERPL CALC-MCNC: 23 MG/DL (ref 0–30)
WBC # BLD AUTO: 5.3 X10(3) UL (ref 4–11)

## 2020-09-08 PROCEDURE — 80061 LIPID PANEL: CPT

## 2020-09-08 PROCEDURE — 36415 COLL VENOUS BLD VENIPUNCTURE: CPT

## 2020-09-08 PROCEDURE — 82306 VITAMIN D 25 HYDROXY: CPT

## 2020-09-08 PROCEDURE — 80053 COMPREHEN METABOLIC PANEL: CPT

## 2020-09-08 PROCEDURE — 85025 COMPLETE CBC W/AUTO DIFF WBC: CPT

## 2020-09-08 PROCEDURE — 84443 ASSAY THYROID STIM HORMONE: CPT

## 2020-09-09 LAB — 25(OH)D3 SERPL-MCNC: 26.8 NG/ML (ref 30–100)

## 2020-09-15 ENCOUNTER — OFFICE VISIT (OUTPATIENT)
Dept: INTERNAL MEDICINE CLINIC | Facility: CLINIC | Age: 50
End: 2020-09-15
Payer: COMMERCIAL

## 2020-09-15 VITALS
BODY MASS INDEX: 26.31 KG/M2 | HEIGHT: 62 IN | RESPIRATION RATE: 16 BRPM | WEIGHT: 143 LBS | SYSTOLIC BLOOD PRESSURE: 138 MMHG | HEART RATE: 86 BPM | DIASTOLIC BLOOD PRESSURE: 86 MMHG | OXYGEN SATURATION: 98 % | TEMPERATURE: 99 F

## 2020-09-15 DIAGNOSIS — E55.9 VITAMIN D DEFICIENCY: ICD-10-CM

## 2020-09-15 DIAGNOSIS — E78.00 HYPERCHOLESTEROLEMIA: ICD-10-CM

## 2020-09-15 DIAGNOSIS — Z83.71 FAMILY HISTORY OF COLONIC POLYPS: ICD-10-CM

## 2020-09-15 DIAGNOSIS — Z00.00 ROUTINE HEALTH MAINTENANCE: ICD-10-CM

## 2020-09-15 DIAGNOSIS — Z78.0 POSTMENOPAUSE: Primary | ICD-10-CM

## 2020-09-15 PROCEDURE — 3079F DIAST BP 80-89 MM HG: CPT | Performed by: INTERNAL MEDICINE

## 2020-09-15 PROCEDURE — 3008F BODY MASS INDEX DOCD: CPT | Performed by: INTERNAL MEDICINE

## 2020-09-15 PROCEDURE — 3075F SYST BP GE 130 - 139MM HG: CPT | Performed by: INTERNAL MEDICINE

## 2020-09-15 PROCEDURE — 99396 PREV VISIT EST AGE 40-64: CPT | Performed by: INTERNAL MEDICINE

## 2020-09-15 RX ORDER — VENLAFAXINE HYDROCHLORIDE 75 MG/1
75 CAPSULE, EXTENDED RELEASE ORAL DAILY
Qty: 90 CAPSULE | Refills: 3 | Status: SHIPPED | OUTPATIENT
Start: 2020-09-15 | End: 2021-10-19

## 2020-09-15 RX ORDER — SIMVASTATIN 10 MG
10 TABLET ORAL NIGHTLY
Qty: 90 TABLET | Refills: 3 | Status: SHIPPED | OUTPATIENT
Start: 2020-09-15 | End: 2021-10-26

## 2020-09-15 NOTE — PROGRESS NOTES
Deepak Chen is a 47year old female. Patient presents with: Annual: Presents for annual PE. Denies any complaints, feels well. HPI:   Deepak Chen is a 47year old female who presents for a complete physical exam.     Still doing keto diet. Cancer Other    • Arthritis Other    • Heart Disease Other    • Neurological Disorder Other    • Breast Cancer Paternal Aunt         63's   • Breast Cancer Maternal Cousin Female         42's   • Breast Cancer Maternal Aunt         62s      Social History: Lipids good     Vitamin D deficiency  Level low (26.8). Add vitamin D 1000 units/day.     Family hx of colon polyps (dad) and colon cancer (paternal aunt)  Pt had colonoscopy on 6/28/13 (no polyps) and again on 7/25/18 (no polyps) with Dr. Martha Hoff.     Pratik

## 2020-10-08 ENCOUNTER — HOSPITAL ENCOUNTER (OUTPATIENT)
Dept: BONE DENSITY | Age: 50
Discharge: HOME | End: 2020-10-08
Attending: INTERNAL MEDICINE
Payer: COMMERCIAL

## 2020-10-08 DIAGNOSIS — Z78.0 POSTMENOPAUSE: ICD-10-CM

## 2020-10-08 PROCEDURE — 77080 DXA BONE DENSITY AXIAL: CPT | Performed by: INTERNAL MEDICINE

## 2020-11-27 ENCOUNTER — TELEMEDICINE (OUTPATIENT)
Dept: INTERNAL MEDICINE CLINIC | Facility: CLINIC | Age: 50
End: 2020-11-27
Payer: COMMERCIAL

## 2020-11-27 DIAGNOSIS — M54.41 ACUTE MIDLINE LOW BACK PAIN WITH BILATERAL SCIATICA: Primary | ICD-10-CM

## 2020-11-27 DIAGNOSIS — M54.42 ACUTE MIDLINE LOW BACK PAIN WITH BILATERAL SCIATICA: Primary | ICD-10-CM

## 2020-11-27 PROCEDURE — 99213 OFFICE O/P EST LOW 20 MIN: CPT | Performed by: INTERNAL MEDICINE

## 2020-11-27 RX ORDER — CYCLOBENZAPRINE HCL 10 MG
10 TABLET ORAL 3 TIMES DAILY PRN
Qty: 90 TABLET | Refills: 0 | Status: SHIPPED | OUTPATIENT
Start: 2020-11-27 | End: 2021-10-21

## 2020-11-27 NOTE — PROGRESS NOTES
This is a telemedicine visit with live, interactive video and audio. Patient understands and accepts financial responsibility for any deductible, co-insurance and/or co-pays associated with this service.     SUBJECTIVE  Ms. Sidhualanis Zachariah is a 45 F PMHx HLD who status: Former Smoker      Smokeless tobacco: Never Used    Alcohol use:  Yes      Alcohol/week: 2.5 standard drinks      Types: 3 Glasses of wine per week      Comment: socially    Drug use: No         Radiology Contrast *    HIVES    Comment:Other reactio to avoid locking up of her back. I have also provided a phone number for physical therapy that she can initiate once the pain is under better control. This will assist with muscle strengthening and stretches to open of any impinged nerves.   Anticipate th [place] were in [place]. Included in this visit, time may have been spent reviewing labs, medications, radiology tests and decision making. Appropriate medical decision-making and tests are ordered as detailed in the plan of care above.   Coding/billing inf

## 2021-01-13 ENCOUNTER — PATIENT MESSAGE (OUTPATIENT)
Dept: INTERNAL MEDICINE CLINIC | Facility: CLINIC | Age: 51
End: 2021-01-13

## 2021-01-13 NOTE — TELEPHONE ENCOUNTER
From: Kelsie Wright  To: Belinda Pagan MD  Sent: 1/13/2021 8:53 AM CST  Subject: Non-Urgent Medical Question    Dr. Edwin Lenz,    I know you're probably getting many requests for the vaccine but I'd like to get it sooner rather than later since our st

## 2021-04-07 NOTE — IMAGING NOTE
Your procedure/Surgery is scheduled for 4/13_, please plan to arrivE PER  SURGEON INSTRUCTIONS___.    Free  service is available Monday through Friday 8AM- 4PM. The Main Information Desk can assist you in finding your check-in destination.    Please bring your insurance card, 's license and a list of your medications, vitamins and supplements to the hospital, including the last dosage and time taken.    ON DAY OF PROCEDURE, CHECK IN AT:  [X  ] Outpatient Care- located on the 1st floor. ([phone])    COVID Testing:  Your covid test is scheduled on: __4/11_ at __12:20PM___    At [facility] on:  [X  ] [address]   [phone]    Important Information:    [  ]  Hibiclens Required           X[   ]  Hibiclens Highly Recommended    Please bathe or shower the evening before and morning of your procedure/surgery.  Avoid using lotions, creams, powders, make-up and deodorant on your clean skin.    Remove all jewelry, earrings, body piercings and contact lenses before coming to the hospital.    You may wear you glasses, hearing aids and/or dentures to the hospital. Please bring a case as they will need to be removed prior to the procedure/surgery.    If you are a smoker, please DO NOT SMOKE FOR 12 HOURS PRIOR  to your procedure/surgery.  This includes cigarettes, marijuana and vaping    If your doctor mentioned that you may might spend the night, please bring a small overnight bag with toiletries and any personal items you may need.  If you use a CPAP machine and will be spending the night, bring your machine, tubing and mask.    Due to anesthesia, you will not be able to operate power tools, drink alcohol, or drive a vehicle for 24 hours after surgery.  You will not be able to walk home, use public transportation or take a cab, Uber or Lyft home.      *You must have a responsible adult (18 or older) to drive you home and stay with you overnight following the  1430:   Mrs. Adri Ro is post right breast ultrasound guided biopsy. Contacted this afternoon to follow-up regarding post biopsy care. Mrs. Adri Ro without concerns at this time.     Results and recommendations reported as follows:    PATHOLOGY:   Right arsen procedure/surgery.*    Diet:    DO NOT EAT OR DRINK AFTER MIDNIGHT PRIOR TO YOUR PROCEDURE/SURGERY.      Medication Instructions:    If you use a rescue inhaler for asthma, please bring it with you on the day of your procedure/surgery.    Starting now, STOP taking supplements, vitamins and fish oil.    STOP taking non-steroidal, anti-inflammatory drugs, otherwise known as NSAIDS, 1WEEK prior to our surgery.  Examples of NSAIDS are Aleve, ibuprofen, Motrin, Advil and Naproxen.        Check with your doctor about stopping: ASPIRIN__    If you are diabetic and take pills for diabetes, stop taking METFORMIN, TRADJENTA 24 hours prior to your procedure/surgery.    Take YOUR OTHER PRESCRIPTION medications before coming to the hospital. If they are pills, you may take them with a small sip of water: _  If there are any changes in your physical condition, such as cold, fever or infection, or you have specific questions regarding your procedure/surgery, please contact your surgeon directly.    For questions regarding these instructions, contact Pre-Admission Testing at [phone], Monday through Friday, 8 am - 4 pm.    On the day of your procedure, please bring in a copy of your complete up-to-date medication list.  PER PHONE. DECLINED EMAIL INSTRUCTIONS

## 2021-06-09 ENCOUNTER — TELEPHONE (OUTPATIENT)
Dept: INTERNAL MEDICINE CLINIC | Facility: CLINIC | Age: 51
End: 2021-06-09

## 2021-06-09 DIAGNOSIS — Z12.31 ENCOUNTER FOR SCREENING MAMMOGRAM FOR MALIGNANT NEOPLASM OF BREAST: Primary | ICD-10-CM

## 2021-06-09 NOTE — TELEPHONE ENCOUNTER
To Dr. Shahana Fernandez----    RN noted pt had mammo diagnostic last year. Please advise if OK for screening this year or diagnostic needed? Thanks!

## 2021-06-09 NOTE — TELEPHONE ENCOUNTER
Pt requesting that mammogram order be entered for her  Please call pt when order in place  Tasked to nursing

## 2021-06-28 ENCOUNTER — HOSPITAL ENCOUNTER (OUTPATIENT)
Dept: MAMMOGRAPHY | Age: 51
Discharge: HOME | End: 2021-06-28
Attending: INTERNAL MEDICINE
Payer: COMMERCIAL

## 2021-06-28 DIAGNOSIS — Z12.31 ENCOUNTER FOR SCREENING MAMMOGRAM FOR MALIGNANT NEOPLASM OF BREAST: ICD-10-CM

## 2021-06-28 PROCEDURE — 77067 SCR MAMMO BI INCL CAD: CPT | Performed by: INTERNAL MEDICINE

## 2021-06-28 PROCEDURE — 77063 BREAST TOMOSYNTHESIS BI: CPT | Performed by: INTERNAL MEDICINE

## 2021-09-13 ENCOUNTER — TELEPHONE (OUTPATIENT)
Dept: INTERNAL MEDICINE CLINIC | Facility: CLINIC | Age: 51
End: 2021-09-13

## 2021-09-13 DIAGNOSIS — Z00.00 ANNUAL PHYSICAL EXAM: Primary | ICD-10-CM

## 2021-09-13 NOTE — TELEPHONE ENCOUNTER
Patient called today to request an order for blood work to be placed in the system to complete before her next appointment. Patient did have an appointment scheduled for her physical with Dr Cooper Ring that needed to be rescheduled.  Patient was informed of t

## 2021-10-08 ENCOUNTER — LAB ENCOUNTER (OUTPATIENT)
Dept: LAB | Age: 51
End: 2021-10-08
Attending: INTERNAL MEDICINE
Payer: COMMERCIAL

## 2021-10-08 DIAGNOSIS — Z00.00 ANNUAL PHYSICAL EXAM: ICD-10-CM

## 2021-10-08 PROCEDURE — 85025 COMPLETE CBC W/AUTO DIFF WBC: CPT

## 2021-10-08 PROCEDURE — 82306 VITAMIN D 25 HYDROXY: CPT

## 2021-10-08 PROCEDURE — 84443 ASSAY THYROID STIM HORMONE: CPT

## 2021-10-08 PROCEDURE — 80053 COMPREHEN METABOLIC PANEL: CPT

## 2021-10-08 PROCEDURE — 36415 COLL VENOUS BLD VENIPUNCTURE: CPT

## 2021-10-08 PROCEDURE — 80061 LIPID PANEL: CPT

## 2021-10-19 RX ORDER — VENLAFAXINE HYDROCHLORIDE 75 MG/1
75 CAPSULE, EXTENDED RELEASE ORAL DAILY
Qty: 90 CAPSULE | Refills: 3 | Status: CANCELLED | OUTPATIENT
Start: 2021-10-19

## 2021-10-21 RX ORDER — VENLAFAXINE HYDROCHLORIDE 75 MG/1
75 CAPSULE, EXTENDED RELEASE ORAL DAILY
Qty: 90 CAPSULE | Refills: 0 | Status: SHIPPED | OUTPATIENT
Start: 2021-10-21 | End: 2021-10-26

## 2021-10-26 ENCOUNTER — OFFICE VISIT (OUTPATIENT)
Dept: INTERNAL MEDICINE CLINIC | Facility: CLINIC | Age: 51
End: 2021-10-26
Payer: COMMERCIAL

## 2021-10-26 VITALS
OXYGEN SATURATION: 98 % | SYSTOLIC BLOOD PRESSURE: 138 MMHG | HEIGHT: 62 IN | WEIGHT: 150 LBS | TEMPERATURE: 99 F | DIASTOLIC BLOOD PRESSURE: 88 MMHG | HEART RATE: 71 BPM | BODY MASS INDEX: 27.6 KG/M2

## 2021-10-26 DIAGNOSIS — Z00.00 ROUTINE HEALTH MAINTENANCE: ICD-10-CM

## 2021-10-26 DIAGNOSIS — Z83.71 FAMILY HISTORY OF COLONIC POLYPS: ICD-10-CM

## 2021-10-26 DIAGNOSIS — E55.9 VITAMIN D DEFICIENCY: ICD-10-CM

## 2021-10-26 DIAGNOSIS — R03.0 ELEVATED BLOOD PRESSURE READING: ICD-10-CM

## 2021-10-26 DIAGNOSIS — E78.00 HYPERCHOLESTEROLEMIA: Primary | ICD-10-CM

## 2021-10-26 DIAGNOSIS — Z12.31 ENCOUNTER FOR SCREENING MAMMOGRAM FOR MALIGNANT NEOPLASM OF BREAST: ICD-10-CM

## 2021-10-26 PROCEDURE — 90471 IMMUNIZATION ADMIN: CPT | Performed by: INTERNAL MEDICINE

## 2021-10-26 PROCEDURE — 90686 IIV4 VACC NO PRSV 0.5 ML IM: CPT | Performed by: INTERNAL MEDICINE

## 2021-10-26 PROCEDURE — 3079F DIAST BP 80-89 MM HG: CPT | Performed by: INTERNAL MEDICINE

## 2021-10-26 PROCEDURE — 3008F BODY MASS INDEX DOCD: CPT | Performed by: INTERNAL MEDICINE

## 2021-10-26 PROCEDURE — 3075F SYST BP GE 130 - 139MM HG: CPT | Performed by: INTERNAL MEDICINE

## 2021-10-26 PROCEDURE — 99396 PREV VISIT EST AGE 40-64: CPT | Performed by: INTERNAL MEDICINE

## 2021-10-26 RX ORDER — VENLAFAXINE HYDROCHLORIDE 75 MG/1
75 CAPSULE, EXTENDED RELEASE ORAL DAILY
Qty: 90 CAPSULE | Refills: 3 | Status: SHIPPED | OUTPATIENT
Start: 2021-10-26

## 2021-10-26 RX ORDER — SIMVASTATIN 20 MG
20 TABLET ORAL NIGHTLY
Qty: 90 TABLET | Refills: 3 | Status: SHIPPED | OUTPATIENT
Start: 2021-10-26 | End: 2022-02-02

## 2021-10-26 RX ORDER — SIMVASTATIN 10 MG
10 TABLET ORAL NIGHTLY
Qty: 90 TABLET | Refills: 3 | Status: CANCELLED | OUTPATIENT
Start: 2021-10-26

## 2021-10-26 NOTE — PROGRESS NOTES
Francisco Nogueira is a 45year old female. Patient presents with:  Physical: Last pap 2018. Mammogram June 2021. Concerned about high cholesterol/blood pressure.        HPI:   Francisco Nogueira is a 45year old female who presents for a complete physical exam. • Breast Cancer Maternal Cousin Female         42's   • Breast Cancer Maternal Aunt         62s      Social History:   Social History    Tobacco Use      Smoking status: Former Smoker      Smokeless tobacco: Never Used    Vaping Use      Vaping Use: Poppin readings in 2 weeks  Check UA with next lab test in 3 mos.     Vitamin D deficiency  Level low (26.8).   Add vitamin D 1000 units/day.     Family hx of colon polyps (dad) and colon cancer (paternal aunt)  Pt had colonoscopy on 6/28/13 (no polyps) and again

## 2021-11-17 ENCOUNTER — OFFICE VISIT (OUTPATIENT)
Dept: DERMATOLOGY CLINIC | Facility: CLINIC | Age: 51
End: 2021-11-17
Payer: COMMERCIAL

## 2021-11-17 DIAGNOSIS — L21.9 SEBORRHEIC DERMATITIS: ICD-10-CM

## 2021-11-17 DIAGNOSIS — Z12.83 SCREENING EXAM FOR SKIN CANCER: Primary | ICD-10-CM

## 2021-11-17 DIAGNOSIS — L57.0 ACTINIC KERATOSIS: ICD-10-CM

## 2021-11-17 PROCEDURE — 99203 OFFICE O/P NEW LOW 30 MIN: CPT | Performed by: PHYSICIAN ASSISTANT

## 2021-11-17 PROCEDURE — 17000 DESTRUCT PREMALG LESION: CPT | Performed by: PHYSICIAN ASSISTANT

## 2021-11-17 RX ORDER — CLOTRIMAZOLE 1 %
11 CREAM (GRAM) TOPICAL 2 TIMES DAILY
Qty: 60 G | Refills: 0 | Status: SHIPPED | OUTPATIENT
Start: 2021-11-17

## 2021-11-17 RX ORDER — IMIQUIMOD 12.5 MG/.25G
1 CREAM TOPICAL
Qty: 24 EACH | Refills: 1 | Status: SHIPPED | OUTPATIENT
Start: 2021-11-18

## 2021-11-17 NOTE — PROGRESS NOTES
enHPI:    Patient ID: Frank Anderson is a 45year old female. Patient presents for full body check. Does have a hx of Aks in the past. No allergies to medications noted. Her brother did have melanoma. Her mother did have quite a few Aks as well.  No  oriented to person, place, and time. ASSESSMENT/PLAN:   1. Screening exam for skin cancer  -After discussion with patient, advised the following:  Reassurance regarding other benign skin lesions.  Signs and symptoms of skin cancer, ABCDE's of

## 2021-12-30 ENCOUNTER — OFFICE VISIT (OUTPATIENT)
Dept: INTERNAL MEDICINE CLINIC | Facility: CLINIC | Age: 51
End: 2021-12-30
Payer: COMMERCIAL

## 2021-12-30 VITALS
OXYGEN SATURATION: 98 % | WEIGHT: 153 LBS | DIASTOLIC BLOOD PRESSURE: 86 MMHG | HEART RATE: 78 BPM | TEMPERATURE: 99 F | SYSTOLIC BLOOD PRESSURE: 144 MMHG | BODY MASS INDEX: 27.45 KG/M2 | RESPIRATION RATE: 16 BRPM | HEIGHT: 62.5 IN

## 2021-12-30 DIAGNOSIS — I10 HYPERTENSION, ESSENTIAL: Primary | ICD-10-CM

## 2021-12-30 DIAGNOSIS — E78.00 HYPERCHOLESTEROLEMIA: ICD-10-CM

## 2021-12-30 PROCEDURE — 3079F DIAST BP 80-89 MM HG: CPT | Performed by: INTERNAL MEDICINE

## 2021-12-30 PROCEDURE — 99213 OFFICE O/P EST LOW 20 MIN: CPT | Performed by: INTERNAL MEDICINE

## 2021-12-30 PROCEDURE — 3077F SYST BP >= 140 MM HG: CPT | Performed by: INTERNAL MEDICINE

## 2021-12-30 PROCEDURE — 3008F BODY MASS INDEX DOCD: CPT | Performed by: INTERNAL MEDICINE

## 2021-12-30 RX ORDER — LISINOPRIL 10 MG/1
10 TABLET ORAL DAILY
Qty: 30 TABLET | Refills: 1 | Status: SHIPPED | OUTPATIENT
Start: 2021-12-30 | End: 2022-02-02

## 2021-12-30 NOTE — PROGRESS NOTES
Carloz Mayen is a 45year old female. Patient presents with:  Checkup: Elev B/P    HPI:     Here for f/u of blood pressure. BP's at home still elevated 150's-160's/90's. Has been limiting her Na+ intake. Eating fruits, vegetables.   Following the [facility] kg)  01/02/20 : 145 lb (65.8 kg)    Body mass index is 27.54 kg/m².       EXAM:   /86   Pulse 78   Temp 98.5 °F (36.9 °C) (Tympanic)   Resp 16   Ht 5' 2.5\" (1.588 m)   Wt 153 lb (69.4 kg)   SpO2 98%   BMI 27.54 kg/m²    Pt's machine 152/96  GENERAL:

## 2022-01-13 RX ORDER — SIMVASTATIN 20 MG
20 TABLET ORAL NIGHTLY
Qty: 90 TABLET | Refills: 3 | OUTPATIENT
Start: 2022-01-13

## 2022-01-13 RX ORDER — VENLAFAXINE HYDROCHLORIDE 75 MG/1
75 CAPSULE, EXTENDED RELEASE ORAL DAILY
Qty: 90 CAPSULE | Refills: 3 | OUTPATIENT
Start: 2022-01-13

## 2022-01-25 RX ORDER — VENLAFAXINE HYDROCHLORIDE 75 MG/1
75 CAPSULE, EXTENDED RELEASE ORAL DAILY
Qty: 90 CAPSULE | Refills: 3 | OUTPATIENT
Start: 2022-01-25

## 2022-01-25 RX ORDER — LISINOPRIL 10 MG/1
10 TABLET ORAL DAILY
Qty: 30 TABLET | Refills: 1 | Status: CANCELLED | OUTPATIENT
Start: 2022-01-25

## 2022-01-25 RX ORDER — SIMVASTATIN 20 MG
20 TABLET ORAL NIGHTLY
Qty: 90 TABLET | Refills: 3 | OUTPATIENT
Start: 2022-01-25

## 2022-01-27 RX ORDER — VENLAFAXINE HYDROCHLORIDE 75 MG/1
75 CAPSULE, EXTENDED RELEASE ORAL DAILY
Qty: 90 CAPSULE | Refills: 3 | OUTPATIENT
Start: 2022-01-27

## 2022-01-27 RX ORDER — SIMVASTATIN 20 MG
20 TABLET ORAL NIGHTLY
Qty: 90 TABLET | Refills: 3 | OUTPATIENT
Start: 2022-01-27

## 2022-01-27 NOTE — TELEPHONE ENCOUNTER
See prior refill encounter, pt sent multiple requests, fausto cruzg sent to pt:    \"Hope this message finds you well.  We received your 3 refill requests - the venlafaxine and simvastatin were both sent to [facility] on 10/26/21 for 1 year supply - ple

## 2022-02-02 ENCOUNTER — LAB ENCOUNTER (OUTPATIENT)
Dept: LAB | Age: 52
End: 2022-02-02
Attending: INTERNAL MEDICINE
Payer: COMMERCIAL

## 2022-02-02 ENCOUNTER — HOSPITAL ENCOUNTER (OUTPATIENT)
Age: 52
Discharge: HOME | End: 2022-02-02
Payer: COMMERCIAL

## 2022-02-02 ENCOUNTER — TELEPHONE (OUTPATIENT)
Dept: INTERNAL MEDICINE CLINIC | Facility: CLINIC | Age: 52
End: 2022-02-02

## 2022-02-02 VITALS
OXYGEN SATURATION: 99 % | HEART RATE: 69 BPM | DIASTOLIC BLOOD PRESSURE: 62 MMHG | SYSTOLIC BLOOD PRESSURE: 101 MMHG | RESPIRATION RATE: 20 BRPM | TEMPERATURE: 98 F

## 2022-02-02 DIAGNOSIS — Z20.822 ENCOUNTER FOR SCREENING LABORATORY TESTING FOR COVID-19 VIRUS: ICD-10-CM

## 2022-02-02 DIAGNOSIS — Z20.822 LAB TEST NEGATIVE FOR COVID-19 VIRUS: ICD-10-CM

## 2022-02-02 DIAGNOSIS — E78.00 HYPERCHOLESTEROLEMIA: ICD-10-CM

## 2022-02-02 DIAGNOSIS — J02.0 STREPTOCOCCAL PHARYNGITIS: Primary | ICD-10-CM

## 2022-02-02 DIAGNOSIS — I10 HYPERTENSION, ESSENTIAL: ICD-10-CM

## 2022-02-02 LAB
ALBUMIN SERPL-MCNC: 4 G/DL (ref 3.4–5)
ALBUMIN/GLOB SERPL: 1.2 {RATIO} (ref 1–2)
ALP LIVER SERPL-CCNC: 49 U/L
ALT SERPL-CCNC: 20 U/L
ANION GAP SERPL CALC-SCNC: 7 MMOL/L (ref 0–18)
AST SERPL-CCNC: 16 U/L (ref 15–37)
BILIRUB SERPL-MCNC: 0.5 MG/DL (ref 0.1–2)
BILIRUB UR QL: NEGATIVE
BUN BLD-MCNC: 16 MG/DL (ref 7–18)
BUN/CREAT SERPL: 24.6 (ref 10–20)
CALCIUM BLD-MCNC: 9.7 MG/DL (ref 8.5–10.1)
CHLORIDE SERPL-SCNC: 102 MMOL/L (ref 98–112)
CHOLEST SERPL-MCNC: 247 MG/DL (ref ?–200)
CO2 SERPL-SCNC: 30 MMOL/L (ref 21–32)
COLOR UR: YELLOW
CREAT BLD-MCNC: 0.65 MG/DL
FASTING PATIENT LIPID ANSWER: YES
FASTING STATUS PATIENT QL REPORTED: YES
GLOBULIN PLAS-MCNC: 3.3 G/DL (ref 2.8–4.4)
GLUCOSE BLD-MCNC: 98 MG/DL (ref 70–99)
GLUCOSE UR-MCNC: NEGATIVE MG/DL
HDLC SERPL-MCNC: 60 MG/DL (ref 40–59)
HGB UR QL STRIP.AUTO: NEGATIVE
LDLC SERPL CALC-MCNC: 167 MG/DL (ref ?–100)
NITRITE UR QL STRIP.AUTO: NEGATIVE
NONHDLC SERPL-MCNC: 187 MG/DL (ref ?–130)
OSMOLALITY SERPL CALC.SUM OF ELEC: 289 MOSM/KG (ref 275–295)
PH UR: 6 [PH] (ref 5–8)
POTASSIUM SERPL-SCNC: 4.4 MMOL/L (ref 3.5–5.1)
PROT SERPL-MCNC: 7.3 G/DL (ref 6.4–8.2)
PROT UR-MCNC: NEGATIVE MG/DL
S PYO AG THROAT QL: POSITIVE
SARS-COV-2 RNA RESP QL NAA+PROBE: NOT DETECTED
SODIUM SERPL-SCNC: 139 MMOL/L (ref 136–145)
SP GR UR STRIP: 1.02 (ref 1–1.03)
TRIGL SERPL-MCNC: 112 MG/DL (ref 30–149)
UROBILINOGEN UR STRIP-ACNC: <2
VLDLC SERPL CALC-MCNC: 22 MG/DL (ref 0–30)

## 2022-02-02 PROCEDURE — 87086 URINE CULTURE/COLONY COUNT: CPT | Performed by: INTERNAL MEDICINE

## 2022-02-02 PROCEDURE — 99213 OFFICE O/P EST LOW 20 MIN: CPT

## 2022-02-02 PROCEDURE — 87880 STREP A ASSAY W/OPTIC: CPT

## 2022-02-02 PROCEDURE — 80061 LIPID PANEL: CPT

## 2022-02-02 PROCEDURE — 81001 URINALYSIS AUTO W/SCOPE: CPT | Performed by: INTERNAL MEDICINE

## 2022-02-02 PROCEDURE — 36415 COLL VENOUS BLD VENIPUNCTURE: CPT

## 2022-02-02 PROCEDURE — 80053 COMPREHEN METABOLIC PANEL: CPT

## 2022-02-02 RX ORDER — AMOXICILLIN 500 MG/1
500 TABLET, FILM COATED ORAL 2 TIMES DAILY
Qty: 20 TABLET | Refills: 0 | Status: SHIPPED | OUTPATIENT
Start: 2022-02-02 | End: 2022-02-12

## 2022-02-02 RX ORDER — SIMVASTATIN 40 MG
40 TABLET ORAL NIGHTLY
Qty: 90 TABLET | Refills: 3 | Status: SHIPPED | OUTPATIENT
Start: 2022-02-02

## 2022-02-02 RX ORDER — LISINOPRIL 10 MG/1
10 TABLET ORAL DAILY
Qty: 90 TABLET | Refills: 3 | Status: SHIPPED | OUTPATIENT
Start: 2022-02-02

## 2022-02-02 NOTE — TELEPHONE ENCOUNTER
Spoke with pt re labs. BP much improved to the \"normal range\" with the lisinopril 10mg/day. CMP normal.  Rx sent to [facility]    LDL is actually higher despite increasing zocor from 10 to 20mg/day. Still doing keto diet. Increase zocor to 40mg/day. She knows she needs to exercise and will start doing so. Repeat lipids in 3 months.

## 2022-05-29 ENCOUNTER — HOSPITAL ENCOUNTER (OUTPATIENT)
Age: 52
Discharge: HOME | End: 2022-05-29
Payer: COMMERCIAL

## 2022-05-29 VITALS
DIASTOLIC BLOOD PRESSURE: 88 MMHG | OXYGEN SATURATION: 97 % | TEMPERATURE: 97 F | SYSTOLIC BLOOD PRESSURE: 136 MMHG | RESPIRATION RATE: 12 BRPM | HEART RATE: 69 BPM

## 2022-05-29 DIAGNOSIS — B34.9 VIRAL SYNDROME: Primary | ICD-10-CM

## 2022-05-29 LAB
S PYO AG THROAT QL: NEGATIVE
SARS-COV-2 RNA RESP QL NAA+PROBE: NOT DETECTED

## 2022-05-29 PROCEDURE — 99212 OFFICE O/P EST SF 10 MIN: CPT

## 2022-05-29 PROCEDURE — 87880 STREP A ASSAY W/OPTIC: CPT

## 2022-05-29 PROCEDURE — 99213 OFFICE O/P EST LOW 20 MIN: CPT

## 2022-05-29 NOTE — ED INITIAL ASSESSMENT (HPI)
Sore throat and headache since Friday. States she has had strep 2x this year. No fevers or chest pain. But c/o SOB with activity.

## 2022-09-10 ENCOUNTER — HOSPITAL ENCOUNTER (OUTPATIENT)
Age: 52
Discharge: HOME | End: 2022-09-10
Payer: COMMERCIAL

## 2022-09-10 VITALS
SYSTOLIC BLOOD PRESSURE: 115 MMHG | HEART RATE: 70 BPM | DIASTOLIC BLOOD PRESSURE: 81 MMHG | TEMPERATURE: 98 F | OXYGEN SATURATION: 97 % | RESPIRATION RATE: 18 BRPM

## 2022-09-10 DIAGNOSIS — J04.0 ACUTE LARYNGITIS: Primary | ICD-10-CM

## 2022-09-10 DIAGNOSIS — R05.1 ACUTE COUGH: ICD-10-CM

## 2022-09-10 LAB — SARS-COV-2 RNA RESP QL NAA+PROBE: NOT DETECTED

## 2022-09-10 PROCEDURE — 99213 OFFICE O/P EST LOW 20 MIN: CPT

## 2022-09-10 RX ORDER — BENZONATATE 100 MG/1
100 CAPSULE ORAL 3 TIMES DAILY PRN
Qty: 10 CAPSULE | Refills: 0 | Status: SHIPPED | OUTPATIENT
Start: 2022-09-10 | End: 2022-09-13

## 2022-09-10 RX ORDER — ALBUTEROL SULFATE 90 UG/1
2 AEROSOL, METERED RESPIRATORY (INHALATION) EVERY 4 HOURS PRN
Qty: 1 EACH | Refills: 0 | Status: SHIPPED | OUTPATIENT
Start: 2022-09-10 | End: 2022-10-10

## 2022-09-10 NOTE — ED INITIAL ASSESSMENT (HPI)
Dry cough with hoarseness of voice since tuesday, had  2 - covid home tests, no fever, no sore throat

## 2022-09-12 ENCOUNTER — TELEPHONE (OUTPATIENT)
Dept: INTERNAL MEDICINE CLINIC | Facility: CLINIC | Age: 52
End: 2022-09-12

## 2022-09-12 NOTE — TELEPHONE ENCOUNTER
COVID triage:    Start of symptoms: 7 days ago, most recent neg covid test Sat/seen in [facility] Sat 09/10 (sent home with Albuterol inhaler and Benzonatate; no relief)     Fever:  [x]  No fever  []  Temperature:   []  Chills  []  Night sweats    Cough:  [] Productive cough  [] Cough with exertion  [x] Dry cough, \"hacking cough\"    Breathing:  [] Wheezing  [] Pain with deep breathing  [] SOB with exertion  [] SOB at rest  [] Heavy breathing  [] Chest discomfort with deep breathing or coughing    GI Symptoms:  [] Diarrhea  [] Nausea  [] Vomiting  [] Abdominal pain  [] Lack of appetite    Other symptoms:  [x] Sore throat  [x] Difficulty swallowing  [] Nasal drainage  [] Nasal congestion  [] PND  [x] Sinus pressure  [] Chest congestion  [x] Head congestion  [x] Facial pain   [x] Ear pain, both sides   [] Body aches  [] Loss of sense of smell   [] Loss of sense of taste  []Conjunctivitis  [x] Headache  [x] Fatigue  [x] Weakness    [x]OTC Medications:  Ibuprofen   Advil cold/sinus     [] Any recent travel? [] Any sick contacts? 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 Augusto Galan:  Advised pt to take another home covid test tomorrow (notify us if positive). If negative, ok to been seen in clinic?

## 2022-09-13 ENCOUNTER — OFFICE VISIT (OUTPATIENT)
Dept: INTERNAL MEDICINE CLINIC | Facility: CLINIC | Age: 52
End: 2022-09-13
Payer: COMMERCIAL

## 2022-09-13 VITALS
DIASTOLIC BLOOD PRESSURE: 76 MMHG | WEIGHT: 151.25 LBS | BODY MASS INDEX: 26.8 KG/M2 | SYSTOLIC BLOOD PRESSURE: 110 MMHG | HEIGHT: 63 IN | HEART RATE: 102 BPM | OXYGEN SATURATION: 95 %

## 2022-09-13 DIAGNOSIS — J06.9 VIRAL URI WITH COUGH: Primary | ICD-10-CM

## 2022-09-13 DIAGNOSIS — E78.00 HYPERCHOLESTEROLEMIA: ICD-10-CM

## 2022-09-13 DIAGNOSIS — I10 PRIMARY HYPERTENSION: ICD-10-CM

## 2022-09-13 DIAGNOSIS — E55.9 VITAMIN D DEFICIENCY: ICD-10-CM

## 2022-09-13 PROCEDURE — 3078F DIAST BP <80 MM HG: CPT | Performed by: INTERNAL MEDICINE

## 2022-09-13 PROCEDURE — 3008F BODY MASS INDEX DOCD: CPT | Performed by: INTERNAL MEDICINE

## 2022-09-13 PROCEDURE — 3074F SYST BP LT 130 MM HG: CPT | Performed by: INTERNAL MEDICINE

## 2022-09-13 PROCEDURE — 99213 OFFICE O/P EST LOW 20 MIN: CPT | Performed by: INTERNAL MEDICINE

## 2022-09-13 RX ORDER — LOSARTAN POTASSIUM 25 MG/1
25 TABLET ORAL DAILY
Qty: 90 TABLET | Refills: 3 | Status: SHIPPED | OUTPATIENT
Start: 2022-09-13

## 2022-09-13 RX ORDER — BENZONATATE 100 MG/1
100 CAPSULE ORAL 3 TIMES DAILY PRN
Qty: 20 CAPSULE | Refills: 0 | Status: SHIPPED | OUTPATIENT
Start: 2022-09-13

## 2022-10-10 ENCOUNTER — HOSPITAL ENCOUNTER (OUTPATIENT)
Dept: MAMMOGRAPHY | Age: 52
Discharge: HOME | End: 2022-10-10
Attending: INTERNAL MEDICINE
Payer: COMMERCIAL

## 2022-10-10 DIAGNOSIS — Z12.31 ENCOUNTER FOR SCREENING MAMMOGRAM FOR MALIGNANT NEOPLASM OF BREAST: ICD-10-CM

## 2022-10-10 PROCEDURE — 77063 BREAST TOMOSYNTHESIS BI: CPT | Performed by: INTERNAL MEDICINE

## 2022-10-10 PROCEDURE — 77067 SCR MAMMO BI INCL CAD: CPT | Performed by: INTERNAL MEDICINE

## 2022-11-29 ENCOUNTER — LAB ENCOUNTER (OUTPATIENT)
Dept: LAB | Age: 52
End: 2022-11-29
Attending: INTERNAL MEDICINE
Payer: COMMERCIAL

## 2022-11-29 DIAGNOSIS — E78.00 HYPERCHOLESTEROLEMIA: ICD-10-CM

## 2022-11-29 DIAGNOSIS — I10 PRIMARY HYPERTENSION: ICD-10-CM

## 2022-11-29 DIAGNOSIS — E55.9 VITAMIN D DEFICIENCY: ICD-10-CM

## 2022-11-29 LAB
ALBUMIN SERPL-MCNC: 3.7 G/DL (ref 3.4–5)
ALBUMIN/GLOB SERPL: 1.1 {RATIO} (ref 1–2)
ALP LIVER SERPL-CCNC: 56 U/L
ALT SERPL-CCNC: 22 U/L
ANION GAP SERPL CALC-SCNC: 6 MMOL/L (ref 0–18)
AST SERPL-CCNC: 15 U/L (ref 15–37)
BASOPHILS # BLD AUTO: 0.06 X10(3) UL (ref 0–0.2)
BASOPHILS NFR BLD AUTO: 1.2 %
BILIRUB SERPL-MCNC: 0.4 MG/DL (ref 0.1–2)
BUN BLD-MCNC: 20 MG/DL (ref 7–18)
BUN/CREAT SERPL: 28.2 (ref 10–20)
CALCIUM BLD-MCNC: 9.7 MG/DL (ref 8.5–10.1)
CHLORIDE SERPL-SCNC: 103 MMOL/L (ref 98–112)
CHOLEST SERPL-MCNC: 187 MG/DL (ref ?–200)
CO2 SERPL-SCNC: 30 MMOL/L (ref 21–32)
CREAT BLD-MCNC: 0.71 MG/DL
DEPRECATED RDW RBC AUTO: 44.3 FL (ref 35.1–46.3)
EOSINOPHIL # BLD AUTO: 0.28 X10(3) UL (ref 0–0.7)
EOSINOPHIL NFR BLD AUTO: 5.4 %
ERYTHROCYTE [DISTWIDTH] IN BLOOD BY AUTOMATED COUNT: 12.8 % (ref 11–15)
FASTING PATIENT LIPID ANSWER: YES
FASTING STATUS PATIENT QL REPORTED: YES
GFR SERPLBLD BASED ON 1.73 SQ M-ARVRAT: 102 ML/MIN/1.73M2 (ref 60–?)
GLOBULIN PLAS-MCNC: 3.5 G/DL (ref 2.8–4.4)
GLUCOSE BLD-MCNC: 95 MG/DL (ref 70–99)
HCT VFR BLD AUTO: 39.8 %
HDLC SERPL-MCNC: 60 MG/DL (ref 40–59)
HGB BLD-MCNC: 13 G/DL
IMM GRANULOCYTES # BLD AUTO: 0.04 X10(3) UL (ref 0–1)
IMM GRANULOCYTES NFR BLD: 0.8 %
LDLC SERPL CALC-MCNC: 113 MG/DL (ref ?–100)
LYMPHOCYTES # BLD AUTO: 1.22 X10(3) UL (ref 1–4)
LYMPHOCYTES NFR BLD AUTO: 23.6 %
MCH RBC QN AUTO: 30.9 PG (ref 26–34)
MCHC RBC AUTO-ENTMCNC: 32.7 G/DL (ref 31–37)
MCV RBC AUTO: 94.5 FL
MONOCYTES # BLD AUTO: 0.53 X10(3) UL (ref 0.1–1)
MONOCYTES NFR BLD AUTO: 10.2 %
NEUTROPHILS # BLD AUTO: 3.05 X10 (3) UL (ref 1.5–7.7)
NEUTROPHILS # BLD AUTO: 3.05 X10(3) UL (ref 1.5–7.7)
NEUTROPHILS NFR BLD AUTO: 58.8 %
NONHDLC SERPL-MCNC: 127 MG/DL (ref ?–130)
OSMOLALITY SERPL CALC.SUM OF ELEC: 290 MOSM/KG (ref 275–295)
PLATELET # BLD AUTO: 274 10(3)UL (ref 150–450)
POTASSIUM SERPL-SCNC: 4.7 MMOL/L (ref 3.5–5.1)
PROT SERPL-MCNC: 7.2 G/DL (ref 6.4–8.2)
RBC # BLD AUTO: 4.21 X10(6)UL
SODIUM SERPL-SCNC: 139 MMOL/L (ref 136–145)
TRIGL SERPL-MCNC: 78 MG/DL (ref 30–149)
TSI SER-ACNC: 2.45 MIU/ML (ref 0.36–3.74)
VIT D+METAB SERPL-MCNC: 27.7 NG/ML (ref 30–100)
VLDLC SERPL CALC-MCNC: 13 MG/DL (ref 0–30)
WBC # BLD AUTO: 5.2 X10(3) UL (ref 4–11)

## 2022-11-29 PROCEDURE — 82306 VITAMIN D 25 HYDROXY: CPT

## 2022-11-29 PROCEDURE — 36415 COLL VENOUS BLD VENIPUNCTURE: CPT

## 2022-11-29 PROCEDURE — 80053 COMPREHEN METABOLIC PANEL: CPT

## 2022-11-29 PROCEDURE — 85025 COMPLETE CBC W/AUTO DIFF WBC: CPT

## 2022-11-29 PROCEDURE — 80061 LIPID PANEL: CPT

## 2022-11-29 PROCEDURE — 84443 ASSAY THYROID STIM HORMONE: CPT | Performed by: INTERNAL MEDICINE

## 2022-12-14 RX ORDER — VENLAFAXINE HYDROCHLORIDE 75 MG/1
CAPSULE, EXTENDED RELEASE ORAL
Qty: 90 CAPSULE | Refills: 3 | Status: SHIPPED | OUTPATIENT
Start: 2022-12-14

## 2023-02-02 ENCOUNTER — OFFICE VISIT (OUTPATIENT)
Dept: INTERNAL MEDICINE CLINIC | Facility: CLINIC | Age: 53
End: 2023-02-02

## 2023-02-02 VITALS
WEIGHT: 157 LBS | HEART RATE: 83 BPM | HEIGHT: 63 IN | OXYGEN SATURATION: 96 % | BODY MASS INDEX: 27.82 KG/M2 | SYSTOLIC BLOOD PRESSURE: 125 MMHG | DIASTOLIC BLOOD PRESSURE: 78 MMHG | TEMPERATURE: 98 F

## 2023-02-02 DIAGNOSIS — Z80.3 FAMILY HISTORY OF BREAST CANCER: ICD-10-CM

## 2023-02-02 DIAGNOSIS — E78.00 HYPERCHOLESTEROLEMIA: ICD-10-CM

## 2023-02-02 DIAGNOSIS — Z00.00 ROUTINE HEALTH MAINTENANCE: ICD-10-CM

## 2023-02-02 DIAGNOSIS — E55.9 VITAMIN D DEFICIENCY: ICD-10-CM

## 2023-02-02 DIAGNOSIS — Z12.4 SCREENING FOR CERVICAL CANCER: Primary | ICD-10-CM

## 2023-02-02 DIAGNOSIS — Z83.71 FAMILY HISTORY OF COLONIC POLYPS: ICD-10-CM

## 2023-02-02 PROCEDURE — 99396 PREV VISIT EST AGE 40-64: CPT | Performed by: INTERNAL MEDICINE

## 2023-02-02 PROCEDURE — 3074F SYST BP LT 130 MM HG: CPT | Performed by: INTERNAL MEDICINE

## 2023-02-02 PROCEDURE — 3008F BODY MASS INDEX DOCD: CPT | Performed by: INTERNAL MEDICINE

## 2023-02-02 PROCEDURE — 3078F DIAST BP <80 MM HG: CPT | Performed by: INTERNAL MEDICINE

## 2023-02-13 LAB — HPV I/H RISK 1 DNA SPEC QL NAA+PROBE: NEGATIVE

## 2023-03-14 ENCOUNTER — MED REC SCAN ONLY (OUTPATIENT)
Dept: INTERNAL MEDICINE CLINIC | Facility: CLINIC | Age: 53
End: 2023-03-14

## 2023-05-10 ENCOUNTER — OFFICE VISIT (OUTPATIENT)
Dept: INTERNAL MEDICINE CLINIC | Facility: CLINIC | Age: 53
End: 2023-05-10

## 2023-05-10 VITALS
HEIGHT: 63 IN | BODY MASS INDEX: 27.64 KG/M2 | SYSTOLIC BLOOD PRESSURE: 138 MMHG | DIASTOLIC BLOOD PRESSURE: 80 MMHG | TEMPERATURE: 98 F | WEIGHT: 156 LBS | OXYGEN SATURATION: 99 % | HEART RATE: 88 BPM

## 2023-05-10 DIAGNOSIS — R51.0 POSITIONAL HEADACHE: Primary | ICD-10-CM

## 2023-05-10 DIAGNOSIS — N81.10 BLADDER PROLAPSE, FEMALE, ACQUIRED: ICD-10-CM

## 2023-05-10 PROCEDURE — 3075F SYST BP GE 130 - 139MM HG: CPT | Performed by: INTERNAL MEDICINE

## 2023-05-10 PROCEDURE — 3008F BODY MASS INDEX DOCD: CPT | Performed by: INTERNAL MEDICINE

## 2023-05-10 PROCEDURE — 99214 OFFICE O/P EST MOD 30 MIN: CPT | Performed by: INTERNAL MEDICINE

## 2023-05-10 PROCEDURE — 3079F DIAST BP 80-89 MM HG: CPT | Performed by: INTERNAL MEDICINE

## 2023-05-17 RX ORDER — LISINOPRIL 10 MG/1
TABLET ORAL
Qty: 90 TABLET | Refills: 3 | OUTPATIENT
Start: 2023-05-17

## 2023-05-17 RX ORDER — SIMVASTATIN 40 MG
40 TABLET ORAL NIGHTLY
Qty: 90 TABLET | Refills: 3 | Status: SHIPPED | OUTPATIENT
Start: 2023-05-17

## 2023-06-08 ENCOUNTER — HOSPITAL ENCOUNTER (OUTPATIENT)
Dept: MRI IMAGING | Facility: HOSPITAL | Age: 53
Discharge: HOME | End: 2023-06-08
Attending: INTERNAL MEDICINE
Payer: COMMERCIAL

## 2023-06-08 DIAGNOSIS — R51.0 POSITIONAL HEADACHE: ICD-10-CM

## 2023-06-08 PROCEDURE — 70553 MRI BRAIN STEM W/O & W/DYE: CPT | Performed by: INTERNAL MEDICINE

## 2023-06-08 PROCEDURE — A9575 INJ GADOTERATE MEGLUMI 0.1ML: HCPCS | Performed by: INTERNAL MEDICINE

## 2023-06-08 RX ORDER — GADOTERATE MEGLUMINE 376.9 MG/ML
15 INJECTION INTRAVENOUS
Status: COMPLETED | OUTPATIENT
Start: 2023-06-08 | End: 2023-06-08

## 2023-06-08 RX ADMIN — GADOTERATE MEGLUMINE 14 ML: 376.9 INJECTION INTRAVENOUS at 07:37:00

## 2023-09-06 RX ORDER — LOSARTAN POTASSIUM 25 MG/1
25 TABLET ORAL DAILY
Qty: 90 TABLET | Refills: 3 | Status: SHIPPED | OUTPATIENT
Start: 2023-09-06

## 2023-09-06 NOTE — TELEPHONE ENCOUNTER
Refill request is for a maintenance medication and has met the criteria specified in the Ambulatory Medication Refill Standing Order for eligibility, visits, laboratory, alerts and was sent to the requested pharmacy.     Requested Prescriptions     Signed Prescriptions Disp Refills    LOSARTAN 25 MG Oral Tab 90 tablet 3     Sig: TAKE 1 TABLET DAILY     Authorizing Provider: Bharathi Montaño     Ordering User: Santana Sims

## 2023-10-10 ENCOUNTER — PATIENT MESSAGE (OUTPATIENT)
Dept: INTERNAL MEDICINE CLINIC | Facility: CLINIC | Age: 53
End: 2023-10-10

## 2023-10-10 DIAGNOSIS — Z12.31 ENCOUNTER FOR SCREENING MAMMOGRAM FOR MALIGNANT NEOPLASM OF BREAST: Primary | ICD-10-CM

## 2023-10-12 NOTE — TELEPHONE ENCOUNTER
Pended routine Screening Mammo but patient did have Diagnostic RT Add Views last year. Routed to Dr. Jennifer Gonzalez to confirm routine screen is correct. Last screening 10/10/2022.

## 2023-11-28 ENCOUNTER — HOSPITAL ENCOUNTER (OUTPATIENT)
Dept: MAMMOGRAPHY | Age: 53
Discharge: HOME | End: 2023-11-28
Attending: INTERNAL MEDICINE
Payer: COMMERCIAL

## 2023-11-28 DIAGNOSIS — Z12.31 ENCOUNTER FOR SCREENING MAMMOGRAM FOR MALIGNANT NEOPLASM OF BREAST: ICD-10-CM

## 2023-11-28 PROCEDURE — 77067 SCR MAMMO BI INCL CAD: CPT | Performed by: INTERNAL MEDICINE

## 2023-11-28 PROCEDURE — 77063 BREAST TOMOSYNTHESIS BI: CPT | Performed by: INTERNAL MEDICINE

## 2023-12-23 RX ORDER — LOSARTAN POTASSIUM 25 MG/1
25 TABLET ORAL DAILY
Qty: 90 TABLET | Refills: 3 | Status: CANCELLED | OUTPATIENT
Start: 2023-12-23

## 2023-12-27 RX ORDER — VENLAFAXINE HYDROCHLORIDE 75 MG/1
CAPSULE, EXTENDED RELEASE ORAL
Qty: 90 CAPSULE | Refills: 0 | Status: SHIPPED | OUTPATIENT
Start: 2023-12-27

## 2023-12-27 NOTE — TELEPHONE ENCOUNTER
1 year was sent in sept    Current refill request refused due to refill is either a duplicate request or has active refills at the pharmacy. Check previous templates. Requested Prescriptions     Pending Prescriptions Disp Refills    losartan 25 MG Oral Tab 90 tablet 3     Sig: Take 1 tablet (25 mg total) by mouth daily.

## 2023-12-27 NOTE — TELEPHONE ENCOUNTER
Overdue for physical    Mychart sent     Refill request is for a maintenance medication and has met the criteria specified in the Ambulatory Medication Refill Standing Order for eligibility, visits, laboratory, alerts and was sent to the requested pharmacy.     Requested Prescriptions     Signed Prescriptions Disp Refills    venlafaxine ER 75 MG Oral Capsule SR 24 Hr 90 capsule 0     Sig: TAKE 1 CAPSULE DAILY     Authorizing Provider: Itz Altman     Ordering User: Marissa Rand

## 2024-02-25 ENCOUNTER — HOSPITAL ENCOUNTER (EMERGENCY)
Facility: HOSPITAL | Age: 54
Discharge: HOME | End: 2024-02-26
Attending: EMERGENCY MEDICINE
Payer: COMMERCIAL

## 2024-02-25 DIAGNOSIS — I10 EPISODE OF HYPERTENSION: Primary | ICD-10-CM

## 2024-02-25 LAB
ALBUMIN SERPL-MCNC: 4.3 G/DL (ref 3.2–4.8)
ALBUMIN/GLOB SERPL: 1.4 {RATIO} (ref 1–2)
ALP LIVER SERPL-CCNC: 76 U/L
ALT SERPL-CCNC: 16 U/L
ANION GAP SERPL CALC-SCNC: 3 MMOL/L (ref 0–18)
AST SERPL-CCNC: 25 U/L (ref ?–34)
BASOPHILS # BLD AUTO: 0.05 X10(3) UL (ref 0–0.2)
BASOPHILS NFR BLD AUTO: 0.7 %
BILIRUB SERPL-MCNC: 0.5 MG/DL (ref 0.3–1.2)
BUN BLD-MCNC: 19 MG/DL (ref 9–23)
BUN/CREAT SERPL: 21.1 (ref 10–20)
CALCIUM BLD-MCNC: 9.2 MG/DL (ref 8.7–10.4)
CHLORIDE SERPL-SCNC: 106 MMOL/L (ref 98–112)
CO2 SERPL-SCNC: 30 MMOL/L (ref 21–32)
CREAT BLD-MCNC: 0.9 MG/DL
DEPRECATED RDW RBC AUTO: 44.1 FL (ref 35.1–46.3)
EGFRCR SERPLBLD CKD-EPI 2021: 76 ML/MIN/1.73M2 (ref 60–?)
EOSINOPHIL # BLD AUTO: 0.24 X10(3) UL (ref 0–0.7)
EOSINOPHIL NFR BLD AUTO: 3.4 %
ERYTHROCYTE [DISTWIDTH] IN BLOOD BY AUTOMATED COUNT: 12.9 % (ref 11–15)
GLOBULIN PLAS-MCNC: 3.1 G/DL (ref 2.8–4.4)
GLUCOSE BLD-MCNC: 105 MG/DL (ref 70–99)
HCT VFR BLD AUTO: 39.5 %
HGB BLD-MCNC: 13.6 G/DL
IMM GRANULOCYTES # BLD AUTO: 0.05 X10(3) UL (ref 0–1)
IMM GRANULOCYTES NFR BLD: 0.7 %
LYMPHOCYTES # BLD AUTO: 1.9 X10(3) UL (ref 1–4)
LYMPHOCYTES NFR BLD AUTO: 26.8 %
MCH RBC QN AUTO: 32 PG (ref 26–34)
MCHC RBC AUTO-ENTMCNC: 34.4 G/DL (ref 31–37)
MCV RBC AUTO: 92.9 FL
MONOCYTES # BLD AUTO: 0.79 X10(3) UL (ref 0.1–1)
MONOCYTES NFR BLD AUTO: 11.2 %
NEUTROPHILS # BLD AUTO: 4.05 X10 (3) UL (ref 1.5–7.7)
NEUTROPHILS # BLD AUTO: 4.05 X10(3) UL (ref 1.5–7.7)
NEUTROPHILS NFR BLD AUTO: 57.2 %
OSMOLALITY SERPL CALC.SUM OF ELEC: 291 MOSM/KG (ref 275–295)
PLATELET # BLD AUTO: 275 10(3)UL (ref 150–450)
POTASSIUM SERPL-SCNC: 3.7 MMOL/L (ref 3.5–5.1)
PROT SERPL-MCNC: 7.4 G/DL (ref 5.7–8.2)
RBC # BLD AUTO: 4.25 X10(6)UL
SODIUM SERPL-SCNC: 139 MMOL/L (ref 136–145)
WBC # BLD AUTO: 7.1 X10(3) UL (ref 4–11)

## 2024-02-25 PROCEDURE — 80053 COMPREHEN METABOLIC PANEL: CPT | Performed by: EMERGENCY MEDICINE

## 2024-02-25 PROCEDURE — 99284 EMERGENCY DEPT VISIT MOD MDM: CPT

## 2024-02-25 PROCEDURE — 85025 COMPLETE CBC W/AUTO DIFF WBC: CPT

## 2024-02-25 PROCEDURE — 93005 ELECTROCARDIOGRAM TRACING: CPT

## 2024-02-25 PROCEDURE — 99283 EMERGENCY DEPT VISIT LOW MDM: CPT

## 2024-02-25 PROCEDURE — 36415 COLL VENOUS BLD VENIPUNCTURE: CPT

## 2024-02-25 PROCEDURE — 93010 ELECTROCARDIOGRAM REPORT: CPT

## 2024-02-25 PROCEDURE — 85025 COMPLETE CBC W/AUTO DIFF WBC: CPT | Performed by: EMERGENCY MEDICINE

## 2024-02-25 PROCEDURE — 80053 COMPREHEN METABOLIC PANEL: CPT

## 2024-02-26 ENCOUNTER — APPOINTMENT (OUTPATIENT)
Dept: CT IMAGING | Facility: HOSPITAL | Age: 54
End: 2024-02-26
Attending: EMERGENCY MEDICINE
Payer: COMMERCIAL

## 2024-02-26 VITALS
TEMPERATURE: 97 F | HEART RATE: 78 BPM | HEIGHT: 62 IN | OXYGEN SATURATION: 94 % | RESPIRATION RATE: 16 BRPM | SYSTOLIC BLOOD PRESSURE: 131 MMHG | WEIGHT: 150 LBS | DIASTOLIC BLOOD PRESSURE: 80 MMHG | BODY MASS INDEX: 27.6 KG/M2

## 2024-02-26 LAB
ATRIAL RATE: 83 BPM
P AXIS: 49 DEGREES
P-R INTERVAL: 166 MS
Q-T INTERVAL: 330 MS
QRS DURATION: 66 MS
QTC CALCULATION (BEZET): 387 MS
R AXIS: 23 DEGREES
T AXIS: 49 DEGREES
VENTRICULAR RATE: 83 BPM

## 2024-02-26 NOTE — ED PROVIDER NOTES
Patient Seen in: [facility] Emergency Department    History     Chief Complaint   Patient presents with    Hypertension       HPI    The patient presents to the ED complaining of onset of a pressure discomfort in her head tonight along with a \"buzzing sensation\" in her head.  She states symptoms started around 5 PM and she took her blood pressure an hour or 2 later and noted that was quite high, 160 systolic but her typical blood pressure is well-controlled.    History reviewed.   Past Medical History:   Diagnosis Date    Actinic keratoses 01/01/2014    L cheek    Allergic rhinitis     Anxiety     Anxiety disorder due to general medical condition with panic attack     Depression     Essential hypertension     Gestational diabetes (HCC)     Lipid screening 03/14/2014    Other and unspecified hyperlipidemia     Postpartum hemorrhage (HCC)     Precancerous skin lesion 01/01/2014    precancer lesion L side of facial cheek    Sinus disorder     per NG ([facility]) : sinus tach symptomatic       History reviewed.   Past Surgical History:   Procedure Laterality Date    COLONOSCOPY  6/13, 7/18    D & C      NEEDLE BIOPSY RIGHT Right 2019    OTHER SURGICAL HISTORY      complications after giving birth         Medications :  (Not in a hospital admission)       Family History   Problem Relation Age of Onset    Pulmonary Disease Mother     Cancer Father     Dementia Father     Pulmonary Disease Father     Asthma Daughter     Asthma Daughter     Diabetes Paternal Grandfather     Breast Cancer Maternal Aunt         60s    Breast Cancer Paternal Aunt         60's    Breast Cancer Maternal Cousin Female         40's    Diabetes Other     Hypertension Other     Cancer Other     Arthritis Other     Heart Disease Other     Neurological Disorder Other        Smoking Status:   Social History     Socioeconomic History    Marital status:    Tobacco Use    Smoking status: Never     Passive exposure: Yes    Smokeless tobacco: Never    Vaping Use    Vaping Use: Never used   Substance and Sexual Activity    Alcohol use: Yes     Comment: socially    Drug use: No   Other Topics Concern    Pt has a pacemaker No    Pt has a defibrillator No    Reaction to local anesthetic No    Caffeine Concern Yes     Comment: Coffee 1-3 cups daily       Constitutional and vital signs reviewed.      Social History and Family History elements reviewed from today, pertinent positives to the presenting problem noted.    Physical Exam     ED Triage Vitals [02/25/24 2149]   BP (!) 167/93   Pulse 110   Resp 20   Temp 96.7 °F (35.9 °C)   Temp src Temporal   SpO2 97 %   O2 Device None (Room air)       All measures to prevent infection transmission during my interaction with the patient were taken. The patient was already wearing a droplet mask on my arrival to the room. Personal protective equipment was worn throughout the duration of the exam.  Handwashing was performed prior to and after the exam.  Stethoscope and any equipment used during my examination was cleaned with super sani-cloth germicidal wipes following the exam.     Physical Exam  Vitals and nursing note reviewed.   Constitutional:       General: She is not in acute distress.     Appearance: She is well-developed.   HENT:      Head: Normocephalic and atraumatic.   Eyes:      General:         Right eye: No discharge.         Left eye: No discharge.      Conjunctiva/sclera: Conjunctivae normal.   Neck:      Trachea: No tracheal deviation.   Cardiovascular:      Rate and Rhythm: Normal rate and regular rhythm.   Pulmonary:      Effort: Pulmonary effort is normal. No respiratory distress.      Breath sounds: Normal breath sounds. No stridor.   Musculoskeletal:         General: No deformity.   Skin:     General: Skin is warm and dry.   Neurological:      General: No focal deficit present.      Mental Status: She is alert and oriented to person, place, and time.   Psychiatric:         Mood and Affect: Mood normal.          Behavior: Behavior normal.         ED Course        Labs Reviewed   COMP METABOLIC PANEL (14) - Abnormal; Notable for the following components:       Result Value    Glucose 105 (*)     BUN/CREA Ratio 21.1 (*)     All other components within normal limits   CBC WITH DIFFERENTIAL WITH PLATELET    Narrative:     The following orders were created for panel order CBC With Differential With Platelet.                  Procedure                               Abnormality         Status                                     ---------                               -----------         ------                                     CBC W/ DIFFERENTIAL[[id number]]                              Final result                                                 Please view results for these tests on the individual orders.   RAINBOW DRAW BLUE   CBC W/ DIFFERENTIAL     EKG    Rate, intervals and axes as noted on EKG Report.  Rate: Normal, 83 bpm  Rhythm: Sinus Rhythm  Reading: Normal intervals, normal ST segments, normal EKG           As Interpreted by me    Imaging Results Available and Reviewed while in ED: No results found.  ED Medications Administered: Medications - No data to display      MDM     Vitals:    02/25/24 2149 02/25/24 2300 02/26/24 0000   BP: (!) 167/93 129/90 131/80   Pulse: 110 89 78   Resp: 20 16 16   Temp: 96.7 °F (35.9 °C)     TempSrc: Temporal     SpO2: 97% 97% 94%   Weight: 68 kg     Height: 157.5 cm (5' 2\")       *I personally reviewed and interpreted all ED vitals.    Pulse Ox: 94%, Room air, Normal     Monitor Interpretation:   normal sinus rhythm, sinus tachycardia as interpreted by me.  The cardiac monitor was ordered to monitor heart rate.    Differential Diagnosis/ Diagnostic Considerations: Uncontrolled hypertension, anxiety, other    Complicating Factors: The patient already has does not have any pertinent problems on file. to contribute to the complexity of this ED evaluation.    Medical Decision  Making  The patient presents to the ED after noticing an elevated blood pressure this evening as well as a pressure and \"buzzing\" discomfort in her head.  She states she feels much better in the ED and blood pressure normalized soon after ED arrival.  Laboratory testing without abnormality.  Initial plan for CT imaging based on patient concern however patient feeling better and now declined CT.  Very low concern for significant intracranial pathology at this time.  I feel stable for discharge with outpatient PCP follow-up.    Problems Addressed:  Episode of hypertension: acute illness or injury    Amount and/or Complexity of Data Reviewed  Labs: ordered. Decision-making details documented in ED Course.  ECG/medicine tests: ordered and independent interpretation performed. Decision-making details documented in ED Course.        Condition upon leaving the department: Stable    Disposition and Plan     Clinical Impression:  1. Episode of hypertension        Disposition:  Discharge    Follow-up:  Veena Hogan MD  [address] [place] [id number]  [phone]    Schedule an appointment as soon as possible for a visit in 3 day(s)        Medications Prescribed:  Current Discharge Medication List

## 2024-02-26 NOTE — ED INITIAL ASSESSMENT (HPI)
PT to ED, steady gait, alert and orientated x4.     PT reporting \"pressure pounding in head...almost feels like it's in my ears\" since 1700. Reports anxiety. Denies vision changes, CP, JOSE A, nausea, vomiting, constipation, diarrhea. FAST exam no deficits.

## 2024-02-26 NOTE — ED QUICK NOTES
The patient is cleared for discharge per Emergency Department physician.  Discharge instructions were reviewed with patient including when and how to follow up with healthcare providers and when to seek emergency care. Medication use was reviewed and prescription details were given per Emergency Department provider request.  Peripheral IV discontinued. The patient dressed self and ambulated to exit with steady gait.

## 2024-02-26 NOTE — ED QUICK NOTES
Fany's daughter (Sabina) was contacted by a population health pharmacist to discuss medication adherence.     She was identified for pharmacy outreach based on prescription fill history of her Cholesterol medicine, atorvastatin 10 mg. Last dispensed on 8/30/23 for a 90-day supply.     The patient is taking the medication. She reports missing 1 doses in the past week.     The following barrier(s) to medication adherence were identified:      Inconvenience - Patient's daughter stated that patient will often drop medications on the floor and not take them. Patient tried to use a pill box but this caused more confusion and patient returned to using standard bottles.    The following solution(s) for medication adherence were recommended:      Other (see comment) - Discussed with patient's daughter that if the patient is dropping the medications due to difficulty opening the bottles, many bottles can have the cap put on upside down to make it easier to open and get to the medications. Patient's daughter expressed understanding and will try this going forward.    No further pharmacist follow-up is required at this time. Patient was given the pharmacist phone number should future questions or concerns arise.    Arya Fletcher, PharmD  Population Health Clinical Pharmacist  [phone]   Patient states she feels better and wants to go home. \"I have been here a long time and just want to go. I feel better.\" Dr Jasso notified.

## 2024-03-19 ENCOUNTER — PATIENT MESSAGE (OUTPATIENT)
Dept: INTERNAL MEDICINE CLINIC | Facility: CLINIC | Age: 54
End: 2024-03-19

## 2024-03-19 DIAGNOSIS — E55.9 VITAMIN D DEFICIENCY: ICD-10-CM

## 2024-03-19 DIAGNOSIS — Z00.00 ANNUAL PHYSICAL EXAM: Primary | ICD-10-CM

## 2024-03-20 NOTE — TELEPHONE ENCOUNTER
From: Marysol Maya  To: Veena Hogan  Sent: 3/19/2024 7:40 PM CDT  Subject: Bloodwork    Hi Dr. Hogan,    I wanted to check to see if there’s an order for bloodwork so I can get it done before my appt on Thursday.     Thanks!  Marysol

## 2024-03-21 ENCOUNTER — OFFICE VISIT (OUTPATIENT)
Dept: INTERNAL MEDICINE CLINIC | Facility: CLINIC | Age: 54
End: 2024-03-21
Payer: COMMERCIAL

## 2024-03-21 VITALS
DIASTOLIC BLOOD PRESSURE: 76 MMHG | HEART RATE: 64 BPM | SYSTOLIC BLOOD PRESSURE: 120 MMHG | OXYGEN SATURATION: 98 % | HEIGHT: 62 IN | TEMPERATURE: 99 F | BODY MASS INDEX: 29.44 KG/M2 | WEIGHT: 160 LBS

## 2024-03-21 DIAGNOSIS — Z78.0 POSTMENOPAUSE: ICD-10-CM

## 2024-03-21 DIAGNOSIS — Z00.00 ROUTINE HEALTH MAINTENANCE: ICD-10-CM

## 2024-03-21 DIAGNOSIS — Z83.719 FAMILY HISTORY OF COLONIC POLYPS: ICD-10-CM

## 2024-03-21 DIAGNOSIS — Z12.31 SCREENING MAMMOGRAM FOR BREAST CANCER: Primary | ICD-10-CM

## 2024-03-21 DIAGNOSIS — R51.0 POSITIONAL HEADACHE: ICD-10-CM

## 2024-03-21 DIAGNOSIS — E78.00 HYPERCHOLESTEROLEMIA: ICD-10-CM

## 2024-03-21 DIAGNOSIS — E55.9 VITAMIN D DEFICIENCY: ICD-10-CM

## 2024-03-21 PROCEDURE — 3078F DIAST BP <80 MM HG: CPT | Performed by: INTERNAL MEDICINE

## 2024-03-21 PROCEDURE — 3008F BODY MASS INDEX DOCD: CPT | Performed by: INTERNAL MEDICINE

## 2024-03-21 PROCEDURE — 3074F SYST BP LT 130 MM HG: CPT | Performed by: INTERNAL MEDICINE

## 2024-03-21 PROCEDURE — 99396 PREV VISIT EST AGE 40-64: CPT | Performed by: INTERNAL MEDICINE

## 2024-03-21 RX ORDER — LOSARTAN POTASSIUM 25 MG/1
25 TABLET ORAL DAILY
Qty: 90 TABLET | Refills: 3 | Status: SHIPPED | OUTPATIENT
Start: 2024-03-21

## 2024-03-21 RX ORDER — MELATONIN
1000 DAILY
COMMUNITY

## 2024-03-21 RX ORDER — SIMVASTATIN 40 MG
40 TABLET ORAL NIGHTLY
Qty: 90 TABLET | Refills: 3 | Status: SHIPPED | OUTPATIENT
Start: 2024-03-21

## 2024-03-21 RX ORDER — VENLAFAXINE HYDROCHLORIDE 75 MG/1
75 CAPSULE, EXTENDED RELEASE ORAL DAILY
Qty: 90 CAPSULE | Refills: 3 | Status: SHIPPED | OUTPATIENT
Start: 2024-03-21

## 2024-03-21 NOTE — PROGRESS NOTES
Marysol Maya is a 53 year old female.  Chief Complaint   Patient presents with    Physical     Patient is here today for a PE. Last pap exam negative on 2/2/23. Patient states that she has been feeling good lately. She notes that she had an episode of hypertension last month. She went to ED 2/25/24-- they did not find anything significant. Patient has been feeling good since ED visit. No issues today.        HPI:   Marysol Maya is a 53 year old female who presents for a complete physical exam.       Gained some weight.  Plans to go back to keto diet. Had kind of fallen off.  Walks and does water aerobics.    Was in ED last month with an episode of HA and really elevated BP. Resolved. Fine since then.  Did not miss meds.  Still with pressure behind her eyes for over a year.  Worse when she leans forward or sneezes or coughs.   Had normal MRI brain in 6/2023.  Doesn't prevent her from working.      SocHx:  Still working in the schools as school psychologist.  [place] is 20 at [employer], aFrida is 15.  Ex- passed away in 7/2021 from GBM.  Mom passed away in 2/2020 from IPF.    Wt Readings from Last 6 Encounters:   03/21/24 160 lb (72.6 kg)   02/25/24 150 lb (68 kg)   05/10/23 156 lb (70.8 kg)   02/02/23 157 lb (71.2 kg)   09/13/22 151 lb 4 oz (68.6 kg)   12/30/21 153 lb (69.4 kg)     Body mass index is 29.26 kg/m².       Current Outpatient Medications   Medication Sig Dispense Refill    cholecalciferol 25 MCG (1000 UT) Oral Tab Take 1 tablet (1,000 Units total) by mouth daily.      venlafaxine ER 75 MG Oral Capsule SR 24 Hr TAKE 1 CAPSULE DAILY 90 capsule 0    LOSARTAN 25 MG Oral Tab TAKE 1 TABLET DAILY 90 tablet 3    simvastatin 40 MG Oral Tab Take 1 tablet (40 mg total) by mouth nightly. 90 tablet 3    Calcium Carb-Cholecalciferol (CALCIUM + D3) 600-200 MG-UNIT Oral Tab Take 1 tablet by mouth daily.        Past Medical History:   Diagnosis Date    Actinic keratoses 01/01/2014    L cheek    Allergic  rhinitis     Anxiety     Anxiety disorder due to general medical condition with panic attack     Depression     Essential hypertension     Gestational diabetes (HCC)     Lipid screening 03/14/2014    Other and unspecified hyperlipidemia     Postpartum hemorrhage (HCC)     Precancerous skin lesion 01/01/2014    precancer lesion L side of facial cheek    Sinus disorder     per NG ([facility]) : sinus tach symptomatic      Past Surgical History:   Procedure Laterality Date    COLONOSCOPY  6/13, 7/18    D & C      NEEDLE BIOPSY RIGHT Right 2019    OTHER SURGICAL HISTORY      complications after giving birth      Family History   Problem Relation Age of Onset    Pulmonary Disease Mother     Cancer Father     Dementia Father     Pulmonary Disease Father     Asthma Daughter     Asthma Daughter     Diabetes Paternal Grandfather     Breast Cancer Maternal Aunt         60s    Breast Cancer Paternal Aunt         60's    Breast Cancer Maternal Cousin Female         40's    Diabetes Other     Hypertension Other     Cancer Other     Arthritis Other     Heart Disease Other     Neurological Disorder Other       Social History:   Social History     Socioeconomic History    Marital status:    Tobacco Use    Smoking status: Never     Passive exposure: Yes    Smokeless tobacco: Never   Vaping Use    Vaping Use: Never used   Substance and Sexual Activity    Alcohol use: Yes     Alcohol/week: 4.0 standard drinks of alcohol     Types: 4 Glasses of wine per week     Comment: socially    Drug use: No   Other Topics Concern    Pt has a pacemaker No    Pt has a defibrillator No    Reaction to local anesthetic No    Caffeine Concern Yes     Comment: Coffee 1-3 cups daily          REVIEW OF SYSTEMS:   GENERAL: feels well otherwise  SKIN: denies any unusual skin lesions  EYES:denies blurred vision or double vision  HEENT: denies nasal congestion, sinus pain or ST  LUNGS: denies shortness of breath with exertion or cough  CARDIOVASCULAR: denies  chest pain, pressure, or palpitations  GI: denies abdominal pain, nausea, vomiting, diarrhea, constipation, hematochezia, or melena  NEURO: denies headaches or dizziness    EXAM:   /76 (BP Location: Right arm, Patient Position: Sitting, Cuff Size: adult)   Pulse 64   Temp 98.5 °F (36.9 °C) (Oral)   Ht 5' 2\" (1.575 m)   Wt 160 lb (72.6 kg)   LMP 04/26/2018   SpO2 98%   BMI 29.26 kg/m²     GENERAL: well developed, well nourished, in no apparent distress  HEENT: normal oropharynx, normal TM's  EYES: PERRLA, EOMI, conjunctivae are pink  NECK: supple, no cervical or supraclavicular LAD, no carotid bruits  BREAST: no dominant or suspicious mass, no axillary LAD  LUNGS: clear to auscultation  CARDIO: RRR, normal S1S2, no gallops or murmurs  GI: soft, NT, ND, NABS, no HSM  EXTREMITIES: no cyanosis, clubbing or edema, +2 DP pulses  )    ASSESSMENT AND PLAN:     Routine health maintenance  Mammo normal 11/2023; reordered  DEXA normal 10/2020; repeat this year w/next mammo  Pap/HPV negative 2/2/23  Continue exercise.    Sees derm for annual exam.    Had Shingrix vaccine 2019. Tdap 8/22/19.       Essential hypertension  -on losartan 25mg/day  -(lisinopril caused cough)  -BP well-controlled    Hypercholesterolemia  -on Simvastatin 20mg/day  -lipids at goal (done thru work 3/13/24)  -LDL 98, HDL 67, TG 64     Vitamin D deficiency  Taking calcium w/D and extra vitamin D 1000u/day     Family hx of colon polyps (dad) and colon cancer (paternal aunt)  Pt had colonoscopy on 6/28/13 (no polyps) and again on 7/25/18 (no polyps) with Dr. Myers.  Repeat advised in 5 years -- overdue; pt knows to schedule.    Anxiety  Stable on Effexor.    Chronic constipation  Sees GI, Dr. Myers; sx improved with keto diet.     Family hx of breast cancer (maternal aunt and cousin, paternal GM)  -referred to genetic counselor Rebekah Martinez; pt will schedule this year.    Headache,positional  -pressure behind her eyes (R>L)  -started in  1/2023; positional (worse when prone, but also when supine -- worse on the side that she turns her head. Does not wake her up at night or interfere with her activities, but it has not resolved  -had covid in 12/2022  -normal MRI brain 6/2023  -refer to neuro Dr. Min; also rec seeing ophtho (has seen [facility] in the past)       RTC 1 yr.

## 2024-06-12 ENCOUNTER — NURSE ONLY (OUTPATIENT)
Dept: HEMATOLOGY/ONCOLOGY | Facility: HOSPITAL | Age: 54
End: 2024-06-12
Attending: GENETIC COUNSELOR, MS
Payer: COMMERCIAL

## 2024-06-12 ENCOUNTER — GENETICS ENCOUNTER (OUTPATIENT)
Dept: GENETICS | Facility: HOSPITAL | Age: 54
End: 2024-06-12
Attending: GENETIC COUNSELOR, MS
Payer: COMMERCIAL

## 2024-06-12 DIAGNOSIS — Z80.41 FAMILY HISTORY OF MALIGNANT NEOPLASM OF OVARY: ICD-10-CM

## 2024-06-12 DIAGNOSIS — Z84.81 FAMILY HISTORY OF BRCA GENE POSITIVE: Primary | ICD-10-CM

## 2024-06-12 DIAGNOSIS — Z80.0 FAMILY HISTORY OF MALIGNANT NEOPLASM OF GASTROINTESTINAL TRACT: ICD-10-CM

## 2024-06-12 DIAGNOSIS — Z80.3 FAMILY HISTORY OF MALIGNANT NEOPLASM OF BREAST: ICD-10-CM

## 2024-06-12 PROCEDURE — 96040 HC GENETIC COUNSELING EA 30 MIN: CPT | Performed by: GENETIC COUNSELOR, MS

## 2024-06-12 PROCEDURE — 36415 COLL VENOUS BLD VENIPUNCTURE: CPT

## 2024-06-13 PROBLEM — Z80.41 FAMILY HISTORY OF MALIGNANT NEOPLASM OF OVARY: Status: ACTIVE | Noted: 2024-06-13

## 2024-06-13 PROBLEM — Z80.3 FAMILY HISTORY OF MALIGNANT NEOPLASM OF BREAST: Status: ACTIVE | Noted: 2024-06-13

## 2024-06-13 PROBLEM — Z84.81 FAMILY HISTORY OF BRCA GENE POSITIVE: Status: ACTIVE | Noted: 2024-06-13

## 2024-06-13 PROBLEM — Z80.0 FAMILY HISTORY OF MALIGNANT NEOPLASM OF GASTROINTESTINAL TRACT: Status: ACTIVE | Noted: 2024-06-13

## 2024-06-13 NOTE — PROGRESS NOTES
Reason for visit: Mrs. Maya is a 53-year-old woman whose paternal aunt was reportedly found to be positive for a BRCA mutation through genetic testing due to her personal history of cancer. She is now interested in pursuing genetic testing for the familial mutation in order to guide her medical care and for information for her future planning. Mrs. Maya is  and is employed as a school psychologist.  She has two daughters, one of whom was adopted into her family.   Relevant family history: Mrs. Maya explains that her paternal aunt was diagnosed with breast cancer at age 60 and had genetic testing with positive results within one of the BRCA genes. A paternal cousin (through a paternal uncle) has also reportedly tested positive for a BRCA mutation.  Other cancers on her paternal side include a paternal aunt with colon cancer diagnosed at age 40 and her father, a smoker, who passed away at age 84 from lung cancer.  She does not have access to any of these test reports at the current time but will work to obtain them.  On her maternal side of her family a maternal aunt was diagnosed with both breast and ovarian cancer and a maternal cousin passed away from breast cancer in her 40's.  A maternal uncle, a smoker, passed away from lung cancer.  One of her brothers has had skin cancer (basal or squamous cell) on his face.  She is otherwise unaware of malignancies on either side of her family.  Her heritage on her paternal side of her family is [place] and [place] and on her maternal side of her family is [place] and she denies any Ashkenazi Episcopalian heritage or consanguinity. Her biological daughter is healthy and well by her report.  Medical history: Mrs. Maya reports that she is in good health. She was 12 at menarche and was 32 at the birth of her daughter. She admits to 8 years' use of oral contraceptives but denies any use of hormone replacement therapy. She denies any gynecological surgery to date and has her  ovaries and uterus intact.  She is post-menopausal, having undergone natural menopause in her 40's.  She has had two colonoscopies with negative results. She denies any history of thyroid issues, uterine fibroids or current skin abnormalities.  She has been consistent with her breast imaging and has a history of one benign breast biopsy.  She admits to former tobacco use and admits to alcohol consumption of 3-4 beverages weekly.    Summary:   We discussed hereditary breast cancer with Mrs. Maya because of her family history. Most breast cancer is not hereditary; however, hereditary cancer is suspected under certain conditions, such as when breast cancer occurs prior to the age of 50 (or premenopausal), when there are multiple affected family members, when breast cancer occurs in combination with other cancers, especially ovarian cancer, and when cancer appears to be passing from generation to generation, or when an individual has more than one cancer diagnosed.  We discussed dominant inheritance, the pattern in which most hereditary cancer conditions are inherited. If an individual has such a cancer predisposing gene mutation, there is a 50% chance that any offspring will not inherit the mutation and a 50% chance that he or she will inherit it. Inheriting the mutation does not automatically mean that one will develop cancer, rather that there is an increased chance for developing certain types of cancer. Cancer predisposing gene mutations can exist in males and females and can be passed on to both male and female offspring. Given the available information about her paternal aunt with a known BRCA mutation, this gives her a 25% chance of being a carrier of the same mutation.   The pros and cons of cancer predisposing gene mutation testing were reviewed with Mrs. Maya. Genetic test results can have significant impact on medical management, planning, screening, surgical decision-making, cancer risk assessment for  the patient and relatives, and psychological/emotional well-being. Mutations in the genes BRCA1 and BRCA2 account for most (but not all) cases of hereditary breast cancer. Mutations in other genes have also been associated with an increased risk for breast or ovarian cancer - but mutations in these other genes are statistically less often seen in hereditary breast or ovarian cancer.   We discussed the benefits and limitations of testing for only the known familial BRCA mutation vs. comprehensive BRCA1/2 testing versus multi-gene panels that include BRCA1/2. Panels are an appropriate option for individuals whose history is suggestive of more than one syndrome, and they improve detection rate for identifying the underlying cause of a hereditary cancer. Limitations of panels include an unknown percentage of variants of unknown significance, as well as an uncertainty of level of risk associated with certain low-penetrance genes, and therefore lack of clear guidelines for risk management of carriers of some of these mutations. There are panels that assess for mutations in only “high risk” and clinically actionable breast cancer genes as well as larger panels that assess for mutations in high, moderate and unknown-penetrance breast cancer genes. Genetic testing for either comprehensive BRCA1/2 analysis or a panel could yield one of three results: no mutation detected, deleterious mutation detected, or variant of uncertain significance. The implications of these potential results were reviewed with Mrs. Maya. Conversely, testing for only the known familial mutation would yield either a positive (mutation detected) or negative (no mutation detected) result and gives less expensive, faster results. I encouraged her to attempt to obtain a copy of her relatives' genetic testing report and reviewed the benefits of this, as it allows for complete interpretation of her results.  She is uncertain that she will be able to obtain  this.    Additionally, we discussed the federal statutes protecting genetic information and non-discrimination for health insurance or employment (JOSÉ ANTONIO) but we reviewed that life insurance, long term healthcare and disability benefits are not covered by these laws, and therefore, should she be interested in obtaining coverage for any of these, she should do this prior to the testing.  Given the reported familial BRCA mutation on her paternal side as well as both breast and ovarian cancer in her maternal family history, Mrs. Maya does meet NCCN criteria for genetic testing on BOTH sides of her family.  After discussing the multiple testing options, Mrs. Maya decided that she would like to pursue testing for a multi-gene panel as she is not certain of the gene her paternal aunt has a mutation in.  Blood was drawn and sent to [facility] for [facility] Common Hereditary Cancers Panel+RNA (48 genes). Turn-around-time is approximately two to three weeks for the testing. Our office will call Mrs. Maya as soon as results are received; post-test counseling can be scheduled at that time.  Plan:  Ordered [facility] Common Hereditary cancers Panel+RNA (48 genes) through [facility].  The Genetics office will call Mrs. aMya when results are received. Post-test counseling can be scheduled at that time.  Recommendations for Mrs. Maya and family members will depend on above test results.    Thank you for referring Mrs. Maya for genetic counseling; please do not hesitate to contact our office if you have any questions or concerns, [phone].  Time spent with patient: 60 minutes  CC: Dr. Veena Hogan

## 2024-06-19 ENCOUNTER — TELEPHONE (OUTPATIENT)
Dept: GENETICS | Facility: HOSPITAL | Age: 54
End: 2024-06-19

## 2024-06-19 PROBLEM — Z15.09 BRCA2 GENE MUTATION POSITIVE: Status: ACTIVE | Noted: 2024-06-19

## 2024-06-19 PROBLEM — Z15.01 BRCA2 GENE MUTATION POSITIVE: Status: ACTIVE | Noted: 2024-06-19

## 2024-06-19 NOTE — TELEPHONE ENCOUNTER
ERNESTINA for Marysol with POSITIVE genetic testing results for BRCA2 mutation ([id number]). Testing was performed by [facility] for 48 gene panel with RNA ([facility] Common Hereditary Cancers Panel+RNA). Released results to her through lab's portal and will mail her a copy. She is now considered high risk for breast and ovarian cancer. Encouraged her to call me back to discuss and shared contact information.

## 2024-06-20 ENCOUNTER — TELEPHONE (OUTPATIENT)
Dept: GENETICS | Facility: HOSPITAL | Age: 54
End: 2024-06-20

## 2024-06-20 NOTE — TELEPHONE ENCOUNTER
Discussed positive BRCA2 results with Marysol over phone. Reviewed recommendations for twice annual breast imaging with mammogram and MRI and twice annual clinical exams vs prophylactic mastectomy with or without reconstruction. Shared contact information for [facility]/Dr. Kylee Delgado. Also discussed BSO and that this can be done with gynecologist or gyne-onc. She requested referral for this and gave her Dr. Fermin Ayoub's contact information. Reviewed need to share information with family members for consideration of testing and also reviewed risks for those individuals of childbearing age. Also recommended annual derm exam. Shared [employer] information for support resource. She was appreciative of all the information and all her questions were answered to the best of my ability.

## 2024-06-27 ENCOUNTER — TELEPHONE (OUTPATIENT)
Dept: SURGERY | Facility: CLINIC | Age: 54
End: 2024-06-27

## 2024-06-27 NOTE — TELEPHONE ENCOUNTER
Patient called to schedule an appointment with Dr. Delgado. Message was sent to clinical team for scheduling advice.    [place]  preferred    Consult  Rebekah Martinez and Dr. Hogan referring  BRCA 2+   Family HX: both breast and ovarian cancer  BX done 2019   All in epic

## 2024-06-28 ENCOUNTER — TELEPHONE (OUTPATIENT)
Dept: SURGERY | Facility: CLINIC | Age: 54
End: 2024-06-28

## 2024-06-28 NOTE — TELEPHONE ENCOUNTER
Patient called on 6/27 to schedule a consult with Dr. Delgado, message was sent to clinical team for scheduling.    Consult   Rebekah Martinez and Dr. Hogan referring   BRCA 2+   Family HX: both breast and ovarian cancer   BX done 2019   All in epic

## 2024-07-01 ENCOUNTER — TELEPHONE (OUTPATIENT)
Dept: SURGERY | Facility: CLINIC | Age: 54
End: 2024-07-01

## 2024-07-01 NOTE — TELEPHONE ENCOUNTER
----- Message from Shirley Navas sent at 7/1/2024  3:02 PM CDT -----  Regarding: RE: Dr. Delgado consult  She can be scheduled with me  thanks  ----- Message -----  From: Esther Kim  Sent: 6/27/2024   3:48 PM CDT  To: Laci Delgado Rns; #  Subject: Dr. Delgado consult                                   [facility] preferred    Consult  Rebekah Martinez and Dr. Hogan referring  BRCA 2+   Family HX: both breast and ovarian cancer  BX done 2019   All in epic

## 2024-07-22 ENCOUNTER — OFFICE VISIT (OUTPATIENT)
Dept: NEUROLOGY | Facility: CLINIC | Age: 54
End: 2024-07-22
Payer: COMMERCIAL

## 2024-07-22 VITALS
HEIGHT: 63 IN | WEIGHT: 160 LBS | SYSTOLIC BLOOD PRESSURE: 105 MMHG | BODY MASS INDEX: 28.35 KG/M2 | OXYGEN SATURATION: 97 % | HEART RATE: 73 BPM | DIASTOLIC BLOOD PRESSURE: 51 MMHG

## 2024-07-22 DIAGNOSIS — R51.0 POSITIONAL HEADACHE: Primary | ICD-10-CM

## 2024-07-22 NOTE — PROGRESS NOTES
[facility] [address], [address]  [facility] [address]  [phone]            Neurology Initial Visit     Referred By: Dr. Villa ref. provider found    Chief Complaint:   Chief Complaint   Patient presents with    Headache     New patient referred by Dr. Veena Hogan. She states she has been experiencing headaches for over a year. She has headaches when she lays down or when she exerts pressure like sneezing, cough, and going to the restroom. She feels pressure and pain behind the eyes. Patient had MRI Brain With and Without Contrast on 06/08/2023. Patient is not taking medication for headaches since they dissipate when she sits up.        HPI:     Marysol Maya is a 53 year old female with history of hypertension, who presents for evaluation of intermittent headaches.  Headaches have been going on for the past year.  Occur primarily when she is laying down to sleep.  She sleeps on her stomach and will start to feel a pressure sensation behind her eyes and around her temples.  If she sits up, the pain will subside and she is usually able to find a position where she can go to sleep without much discomfort.  Sleeping on her side also relieves the pain.  Sleep is good overall, she snores but has no daytime drowsiness.  She does grind her teeth at night.  During the daytime, headaches only occur when having bowel movements, coughing, and sneezing.  Denies any other neurologic symptoms with straining.     Past Medical History:    Actinic keratoses    L cheek    Allergic rhinitis    Anxiety    Anxiety disorder due to general medical condition with panic attack    Depression    Essential hypertension    Gestational diabetes (HCC)    Lipid screening    Other and unspecified hyperlipidemia    Postpartum hemorrhage (HCC)    Precancerous skin lesion    precancer lesion L side of facial cheek    Sinus disorder    per NG ([facility]) : sinus tach symptomatic       Past Surgical History:    Procedure Laterality Date    Colonoscopy  6/13, 7/18    D & c      Needle biopsy right Right 2019    Other surgical history      complications after giving birth       Social history:  History   Smoking Status    Never   Smokeless Tobacco    Never     History   Alcohol Use    4.0 standard drinks of alcohol/week    4 Glasses of wine per week     Comment: socially     History   Drug Use No       Family History   Problem Relation Age of Onset    Cancer Father         lung; smoker    Dementia Father     Pulmonary Disease Father     Pulmonary Disease Mother     Asthma Daughter     Asthma Other     Diabetes Paternal Grandfather     Skin cancer Brother         bcc/scc    Cancer Maternal Aunt 60        breast and ovarian cancer    Breast Cancer Maternal Aunt         60s    Genetic Disease Paternal Aunt         BRCA+    Cancer Paternal Aunt 60        breast; reportedly BRCA+    Breast Cancer Paternal Aunt         60's    Cancer Paternal Aunt 40        colon ca    Cancer Maternal Cousin Female 42    Breast Cancer Maternal Cousin Female         40's    Diabetes Other     Hypertension Other     Cancer Other     Arthritis Other     Heart Disease Other     Neurological Disorder Other     Cancer Maternal Uncle         lung ca; smoker    Genetic Disease Paternal Cousin Female         BRCA+         Current Outpatient Medications:     cholecalciferol 25 MCG (1000 UT) Oral Tab, Take 1 tablet (1,000 Units total) by mouth daily., Disp: , Rfl:     simvastatin 40 MG Oral Tab, Take 1 tablet (40 mg total) by mouth nightly., Disp: 90 tablet, Rfl: 3    venlafaxine ER 75 MG Oral Capsule SR 24 Hr, Take 1 capsule (75 mg total) by mouth daily., Disp: 90 capsule, Rfl: 3    losartan 25 MG Oral Tab, Take 1 tablet (25 mg total) by mouth daily., Disp: 90 tablet, Rfl: 3    Calcium Carb-Cholecalciferol (CALCIUM + D3) 600-200 MG-UNIT Oral Tab, Take 1 tablet by mouth daily., Disp: , Rfl:     Allergies   Allergen Reactions    Radiology Contrast Iodinated  Dyes HIVES     Other reaction(s): IODINE    Lisinopril Coughing       ROS:   As in HPI, the rest of the 14 system review was done and was negative      Physical Exam:  Vitals:    07/22/24 1334   BP: 105/51   Pulse: 73   SpO2: 97%   Weight: 160 lb (72.6 kg)   Height: 63\"       General: No apparent distress, well nourished, well groomed.  Head- Normocephalic, atraumatic  Eyes- No redness or swelling    Neurological:   Mental Status:  Mental Status- Alert, conversant, speech fluent, following all commands and Fund of knowledge appropriate for education and age    Cranial Nerves:  II.- Visual fields full to confrontation,       Fundoscopic Exam- Sharp optic discs, no pallor, III, IV, VI- EOM intact, PERRL, V. Facial sensation intact, and VII. Face symmetric, no facial weakness    Motor Exam:  Strength- upper extremities 5/5 proximally and distally                - lower  extremities 5/5 proximally and distally    Sensory Exam:  Pinprick- intact in all 4 extremities  Vibration- intact in all 4 extremities    Deep Tendon Reflexes:  Biceps 2+ bilateral symmetric  Triceps 2+ bilateral symmetric  Brachioradialis 2 + bilateral symmetric  Patellar 2+ bilateral symmetric  Ankle jerks 2+ bilateral symmetric    Coordination:  No dysmetria with finger to nose bilaterally    Gait:  Normal casual gait    Labs:    Lab Results   Component Value Date    TSH 2.450 11/29/2022     Lab Results   Component Value Date    HDL 60 (H) 11/29/2022     (H) 11/29/2022    TRIG 78 11/29/2022     Lab Results   Component Value Date    HGB 13.6 02/25/2024    HCT 39.5 02/25/2024    MCV 92.9 02/25/2024    WBC 7.1 02/25/2024    .0 02/25/2024      Lab Results   Component Value Date    BUN 19 02/25/2024    CA 9.2 02/25/2024    ALT 16 02/25/2024    AST 25 02/25/2024    ALB 4.3 02/25/2024     02/25/2024    K 3.7 02/25/2024     02/25/2024    CO2 30.0 02/25/2024      I have reviewed labs.    Imaging Studies:  I have independently  reviewed imaging.  Reviewed MRI brain with neuroradiology, there is no low-lying cerebellar tonsils.  There is a small arachnoid cyst in the posterior fossa, but no other signs of elevated pressure such as changes to arachnoid granulations, optic nerve sheath, transverse sinus, etc.      Assessment   Marysol Maya is a 53 year old female with h/o HTN, who presents for evaluation of intermittent headaches.  Headaches are provoked by laying down flat, especially on her stomach, and with straining during bowel movement, cough, sneezing.  In between these situations, there are no headaches.  This pattern does suggest elevated pressure intermittently, but MRI brain shows no abnormality to explain elevated pressures.  Reviewed imaging with neuroradiology and there are no low-lying cerebellar tonsils, no Chiari malformation.      1. Positional headache  -Overall felt to be benign in nature given nonprogressive course and normal imaging  -Agree with Optho exam, exclude elevated pressure within the eyes  -If eye exam is normal and headaches are not worsening, do not need to repeat imaging, but if headaches worsen, consider repeat MRI brain       Education and counseling provided to patient. Instructed patient to call my office or seek medical attention immediately if symptoms worsen.  Patient verbalized understanding of information given. All questions were answered. All side effects of drugs were discussed.     Time spent for examination, counseling and coordination of care such as potential treatment options- 44 minutes with more than 50% of the time spent counseling the patient.    Return to clinic in: No follow-ups on file.    Valarie Mitchell MD

## 2024-07-30 ENCOUNTER — OFFICE VISIT (OUTPATIENT)
Dept: INTERNAL MEDICINE CLINIC | Facility: CLINIC | Age: 54
End: 2024-07-30
Payer: COMMERCIAL

## 2024-07-30 VITALS
DIASTOLIC BLOOD PRESSURE: 66 MMHG | SYSTOLIC BLOOD PRESSURE: 114 MMHG | TEMPERATURE: 98 F | HEART RATE: 67 BPM | BODY MASS INDEX: 28.7 KG/M2 | WEIGHT: 162 LBS | HEIGHT: 63 IN | RESPIRATION RATE: 16 BRPM | OXYGEN SATURATION: 98 %

## 2024-07-30 DIAGNOSIS — Z15.09 BRCA2 GENE MUTATION POSITIVE: Primary | ICD-10-CM

## 2024-07-30 DIAGNOSIS — Z15.01 BRCA2 GENE MUTATION POSITIVE: Primary | ICD-10-CM

## 2024-07-30 PROCEDURE — 3074F SYST BP LT 130 MM HG: CPT | Performed by: INTERNAL MEDICINE

## 2024-07-30 PROCEDURE — 3078F DIAST BP <80 MM HG: CPT | Performed by: INTERNAL MEDICINE

## 2024-07-30 PROCEDURE — 99214 OFFICE O/P EST MOD 30 MIN: CPT | Performed by: INTERNAL MEDICINE

## 2024-07-30 PROCEDURE — 3008F BODY MASS INDEX DOCD: CPT | Performed by: INTERNAL MEDICINE

## 2024-07-30 NOTE — PROGRESS NOTES
Marysol Maya is a 53 year old female.  Chief Complaint   Patient presents with    Checkup     Discuss results of genetic testing     HPI:     Had genetic testing recently (due to strong family hx of breast ca) -- tested positive for BRCA 2 mutation (thru genetic counselor Rebekah Martinez).  She saw gyne onc Dr. Ayoub -- scheduled for bilateral lap oophorectomy/salpingectomy 8/8/24.  Scheduled to see breast surgeon (Dr. Delgado's) [facility] on 9/23/24.    Paternal aunt with breast ca x 2 -- her daughter (pt's cousin) tested positive for BRCA 2.  Other female paternal cousin was also BRCA2 positive  Maternal cousin with breast ca in her 40's.  Maternal aunt with breast cancer.  Other maternal aunt with ovarian cancer      Current Outpatient Medications   Medication Sig Dispense Refill    cholecalciferol 25 MCG (1000 UT) Oral Tab Take 1 tablet (1,000 Units total) by mouth daily.      simvastatin 40 MG Oral Tab Take 1 tablet (40 mg total) by mouth nightly. 90 tablet 3    venlafaxine ER 75 MG Oral Capsule SR 24 Hr Take 1 capsule (75 mg total) by mouth daily. 90 capsule 3    losartan 25 MG Oral Tab Take 1 tablet (25 mg total) by mouth daily. 90 tablet 3    Calcium Carb-Cholecalciferol (CALCIUM + D3) 600-200 MG-UNIT Oral Tab Take 1 tablet by mouth daily.        Past Medical History:    Actinic keratoses    L cheek    Allergic rhinitis    Anxiety    Anxiety disorder due to general medical condition with panic attack    Depression    Essential hypertension    Gestational diabetes (HCC)    Lipid screening    Other and unspecified hyperlipidemia    Postpartum hemorrhage (HCC)    Precancerous skin lesion    precancer lesion L side of facial cheek    Sinus disorder    per NG ([facility]) : sinus tach symptomatic      Social History:  Social History     Socioeconomic History    Marital status:    Tobacco Use    Smoking status: Never     Passive exposure: Yes    Smokeless tobacco: Never   Vaping Use    Vaping status: Never Used    Substance and Sexual Activity    Alcohol use: Yes     Alcohol/week: 4.0 standard drinks of alcohol     Types: 4 Glasses of wine per week     Comment: socially    Drug use: No   Other Topics Concern    Pt has a pacemaker No    Pt has a defibrillator No    Reaction to local anesthetic No    Caffeine Concern Yes     Comment: Coffee 1-3 cups daily        REVIEW OF SYSTEMS:   GENERAL HEALTH: feels well otherwise  RESPIRATORY: no SOB  CARDIOVASCULAR: no chest pain/pressure  GI: no nausea, vomiting, diarrhea    Wt Readings from Last 5 Encounters:   07/30/24 162 lb (73.5 kg)   07/22/24 160 lb (72.6 kg)   03/21/24 160 lb (72.6 kg)   02/25/24 150 lb (68 kg)   05/10/23 156 lb (70.8 kg)     Body mass index is 28.7 kg/m².      EXAM:   /66   Pulse 67   Temp 98.1 °F (36.7 °C) (Oral)   Resp 16   Ht 5' 3\" (1.6 m)   Wt 162 lb (73.5 kg)   LMP 04/26/2018   SpO2 98%   BMI 28.70 kg/m²   GENERAL: well developed, well nourished, in no apparent distress  BREASTS: no masses    ASSESSMENT AND PLAN:     BRCA 2 mutation  -family hx of breast cancer (paternal aunt w/breast cancer x 2 -- aunt's daughter (pt's cousin) with BRCA2 mutation; another paternal cousin also with BRCA2 mutation.  Maternal cousin with breast ca in her 40's.  Maternal aunt with breast cancer.  Other maternal aunt with ovarian cancer -- but no genetic testing done on maternal side)  -Pt is scheduled for elective prophylactic bilateral lap oophorectomy/salpingectomy on 8/8/24 with Dr. Ayoub at [facility]  -has upcoming consultation with breast surgery APN on 9/23/24 to discuss prophylactic bilateral mastectomy  -no family hx of pancreatic cancer, so no definite indication for surveillance  -discussed increased risk of melanoma; referred to derm Dr. Mini Puri for ALETA  -UTD on colonoscopy -- normal colonoscopy 6/27/24 (Dr. Myers) - advised for repeat in 5 years given BRCA2 and family hx of adenomatous polyps (father 50's) and colon cancer (aunt 50's)      The  patient indicates understanding of these issues and agrees to the plan.    Veena Hogan MD, 07/30/24, 4:10 PM

## 2024-09-16 ENCOUNTER — MED REC SCAN ONLY (OUTPATIENT)
Dept: INTERNAL MEDICINE CLINIC | Facility: CLINIC | Age: 54
End: 2024-09-16

## 2024-09-23 ENCOUNTER — OFFICE VISIT (OUTPATIENT)
Dept: SURGERY | Facility: CLINIC | Age: 54
End: 2024-09-23
Payer: COMMERCIAL

## 2024-09-23 VITALS
RESPIRATION RATE: 18 BRPM | BODY MASS INDEX: 28 KG/M2 | OXYGEN SATURATION: 94 % | TEMPERATURE: 98 F | HEART RATE: 74 BPM | SYSTOLIC BLOOD PRESSURE: 125 MMHG | WEIGHT: 159 LBS | DIASTOLIC BLOOD PRESSURE: 82 MMHG

## 2024-09-23 DIAGNOSIS — Z91.89 AT HIGH RISK FOR BREAST CANCER: ICD-10-CM

## 2024-09-23 DIAGNOSIS — Z15.09 BRCA2 POSITIVE: Primary | ICD-10-CM

## 2024-09-23 DIAGNOSIS — Z15.01 BRCA2 POSITIVE: Primary | ICD-10-CM

## 2024-09-23 PROCEDURE — 3079F DIAST BP 80-89 MM HG: CPT

## 2024-09-23 PROCEDURE — 3074F SYST BP LT 130 MM HG: CPT

## 2024-09-23 PROCEDURE — 99203 OFFICE O/P NEW LOW 30 MIN: CPT

## 2024-09-24 ENCOUNTER — TELEPHONE (OUTPATIENT)
Dept: SURGERY | Facility: CLINIC | Age: 54
End: 2024-09-24

## 2024-09-24 NOTE — PROGRESS NOTES
[facility]    History of Present Illness:  This is the first visit for this 53 year old woman, who presents for breast cancer risk evaluation related to being BRCA2+. The patient currently has not had any breast complaints at this time.  She denies specific breast lumps, nipple discharge, skin changes or other problems.  She has not had a prior history of breast disease or breast biopsy.  Most recent imaging was a bilateral screening mammogram on 2023 which showed no mammographic evidence of malignancy bilaterally.  She has undergone a laparoscopic BSO by Dr. Ayoub.  Past Medical History:  Past Medical History:    Actinic keratoses    L cheek    Allergic rhinitis    Anxiety    Anxiety disorder due to general medical condition with panic attack    Depression    Essential hypertension    Gestational diabetes (HCC)    Lipid screening    Other and unspecified hyperlipidemia    Postpartum hemorrhage (HCC)    Precancerous skin lesion    precancer lesion L side of facial cheek    Sinus disorder    per NG ([facility]) : sinus tach symptomatic       Past Surgical History:    Past Surgical History:   Procedure Laterality Date    Colonoscopy  ,     D & c      Needle biopsy right Right     Other surgical history      complications after giving birth       Gynecological History:  Pt is a   Pt was 32 years old at time of first pregnancy.    She has cumulative breastfeeding history of 8 months.  She denies any history of hormone replacement therapy   She has history of oral contraceptive use for 4 years   She denies infertility treatment to achieve pregnancy.    Medications:     cholecalciferol 25 MCG (1000 UT) Oral Tab Take 1 tablet (1,000 Units total) by mouth daily.      simvastatin 40 MG Oral Tab Take 1 tablet (40 mg total) by mouth nightly. 90 tablet 3    venlafaxine ER 75 MG Oral Capsule SR 24 Hr Take 1 capsule (75 mg total) by mouth daily. 90 capsule 3     losartan 25 MG Oral Tab Take 1 tablet (25 mg total) by mouth daily. 90 tablet 3    Calcium Carb-Cholecalciferol (CALCIUM + D3) 600-200 MG-UNIT Oral Tab Take 1 tablet by mouth daily.         Allergies:    Allergies   Allergen Reactions    Radiology Contrast Iodinated Dyes HIVES     Other reaction(s): IODINE    Lisinopril Coughing       Family History:  Family History   Problem Relation Age of Onset    Cancer Father         lung; smoker    Dementia Father     Pulmonary Disease Father     Pulmonary Disease Mother     Asthma Daughter     Asthma Other     Diabetes Paternal Grandfather     Skin cancer Brother         bcc/scc    Cancer Maternal Aunt 60        breast and ovarian cancer    Breast Cancer Maternal Aunt         60s    Genetic Disease Paternal Aunt         BRCA+    Cancer Paternal Aunt 60        breast; reportedly BRCA+    Breast Cancer Paternal Aunt         60's    Cancer Paternal Aunt 40        colon ca    Cancer Maternal Cousin Female 42    Breast Cancer Maternal Cousin Female         40's    Diabetes Other     Hypertension Other     Cancer Other     Arthritis Other     Heart Disease Other     Neurological Disorder Other     Cancer Maternal Uncle         lung ca; smoker    Genetic Disease Paternal Cousin Female         BRCA+     She is not of Ashkenazi Gnosticism ancestry.    Social History:  History   Alcohol Use    4.0 standard drinks of alcohol/week    4 Glasses of wine per week     Comment: socially       History   Smoking Status    Never   Smokeless Tobacco    Never     Ms. Marysol Maya is  with 1 child and 3 step-children. She has 2 siblings. She is currently Employed full-time    Review of Systems:  General: The patient denies, fever, chills, night sweats, fatigue, generalized weakness, change in appetite or weight loss.    HEENT:     The patient denies eye irritation, cataracts, redness, glaucoma, yellowing of the eyes, change in vision, color blindness, or wears contacts/glasses. The patient  denies hearing loss, ringing in the ears, ear drainage, earaches, nasal congestion, nose bleeds, snoring, pain in mouth/throat, hoarseness, change in voice, facial trauma.    Respiratory:  The patient denies chronic cough, phlegm, hemoptysis, pleurisy/chest pain, pneumonia, asthma, wheezing, difficulty in breathing with exertion, emphysema, chronic bronchitis, shortness of breath or abnormal sound when breathing.     Cardiovascular:  There is no history of chest pain, chest pressure/discomfort, palpitations, irregular heartbeat, fainting or near-fainting, difficulty breathing when lying flat, SOB/Coughing at night, swelling of the legs or chest pain while walking.    Breasts:  See history of present illness    Gastrointestinal:     There is no history of difficulty or pain with swallowing, reflux symptoms, vomiting, dark or bloody stools, +constipation, yellowing of the skin, indigestion, nausea, change in bowel habits, diarrhea, abdominal pain or vomiting blood.     Genitourinary:  The patient denies frequent urination, needing to get up at night to urinate, urinary hesitancy or retaining urine, painful urination, urinary incontinence, decreased urine stream, blood in the urine or vaginal/penile discharge.    Skin:    The patient denies rash, itching, skin lesions, dry skin, change in skin color or change in moles.     Hematologic/Lymphatic:  The patient denies easily bruising or bleeding or persistent swollen glands or lymph nodes.     Musculoskeletal:  The patient denies muscle aches/pain, joint pain, stiff joints, neck pain, back pain or bone pain.    Neuropsychiatric:  There is no history of migraines or severe headaches, seizure/epilepsy, speech problems, coordination problems, trembling/tremors, fainting/black outs, dizziness, memory problems, loss of sensation/numbness, problems walking, weakness, tingling or burning in hands/feet. There is no history of abusive relationship, bipolar disorder, sleep  disturbance, +anxiety, +depression or feeling of despair.    Endocrine:    There is no history of poor/slow wound healing, weight loss/gain, fertility or hormone problems, cold intolerance, thyroid disease.     Allergic/Immunologic:  There is no history of hives, hay fever, angioedema or anaphylaxis.    /82 (BP Location: Right arm, Patient Position: Sitting, Cuff Size: adult)   Pulse 74   Temp 98.4 °F (36.9 °C)   Resp 18   Wt 72.1 kg (159 lb)   LMP 04/26/2018   SpO2 94%   BMI 28.17 kg/m²     Physical Exam:  The patient is an alert, oriented, well-nourished and  well-developed woman who appears her stated age. Her speech patterns and movements are normal. Her affect is appropriate.    HEENT: The head is normocephalic. The neck is supple. The thyroid is not enlarged and is without palpable masses/nodules. There are no palpable masses. The trachea is in the midline. Conjunctiva are clear, non-icteric.    Chest: The chest expands symmetrically. The lungs are clear to auscultation.    Heart: The rhythm is regular.  There are no murmurs, rubs, gallops or thrills.    Breasts:  Her breasts are symmetrical with a cup size 38D.  Right breast:: The skin, nipple ,and areola appear normal. There is no skin dimpling with movement of the pectoralis. There is no nipple retraction. No nipple discharge can be elicited. The parenchyma is mildly nodular. There are no dominant masses in the breast. The axillary tail is normal.  Left breast:   The skin, nipple, and areola appear normal. There is no skin dimpling with movement of the pectoralis. There is no nipple retraction. No nipple discharge can be elicited. The parenchyma is mildly nodular. There are no dominant masses in the breast. The axillary tail is normal.    Abdomen:  The abdomen is soft, flat and non tender. The liver is not enlarged. There are no palpable masses.    Lymph Nodes:  The supraclavicular, axillary and cervical regions are free of significant  lymphadenopathy.    Back: There is no vertebral column tenderness.    Skin: The skin appears normal. There are no suspicious appearing rashes or lesions.    Extremities: The extremities are without deformity, cyanosis or edema.      Assessment:  53 year old year old female with an increased risk for breast cancer based.    Discussion and Plan:    The lifetime risk for breast cancer in a BRCA2 mutation carrier is up to 87%. There is also up to a 30% lifetime risk of ovarian cancer in BRCA2 mutation carrier. Other cancer risks in carriers include up to a 7% lifetime risk for pancreatic cancer and, in male carriers, an increased risk of prostate cancer and up to a 7% lifetime risk of male breast cancer. It was explained that these are generalized risks for BRCA2 carriers but that some BRCA2 mutations may be associated with a higher or lower risk for these cancers than others and/or cancer risks can be modified by other genetic variants present in the family. Cancer risks associated with one particular BRCA2 mutation over another are not currently known.   For female BRCA1/2 deleterious mutation carriers the following is recommended and was discussed with the patient:   1. Breast awareness starting at age 18 years. This includes developing a familiarity with her breasts, promptly reporting breast changes to their health care provider, and periodic and consistent breast self exam. Premenopausal women may find that self exam is most informative when performed at the end of menses.   2. Clinical breast exam by a health care provider every 6 months beginning at the age of 25 years.   3. Annual mammography and breast MRI screening beginning at age 25 years such that imaging is performed every 6 months. Screening may start earlier based upon family history.   4. Consider risk-reducing prophylactic mastectomy.   5. Risk-reducing salpingo-oophorectomy by a gynecologic oncologist, ideally between 35-40 years, and upon completion  of child bearing or individualized based on earliest age of onset of ovarian cancer in the family.   6. For those who have elected against risk-reducing salpingo-oophorectomy, concurrent transvaginal ultrasound (preferably day 1-10 of menstrual cycle in premenopausal women) +  (preferably after day 5 of menstrual cycle in premenopausal women) every 6 months starting at age 30 years or 5-10 years before the earliest age of first diagnosis of ovarian cancer in the family.   7. Consider chemopreventative strategies for breast and ovarian cancer such as tamoxifen, raloxifene, exemestane for breast cancer and oral contraceptive medication (controversial) for ovarian cancer.     The patient has no clinical or radiographic evidence of malignancy on exam today.  We then talked about surveillance and I recommended semiannual breast exams as well as continuing with annual mammography.  At this point, I recommend annual breast MRI, as Marysol Maya does have a lifetime risk of breast cancer >20% as per ACS recommendations.         The patient was given ample opportunity for questions, and those questions were answered to her satisfaction.she will be due for a breast MRI now.  Patient is interested in proceeding with risk reducing prophylactic bilateral mastectomies.  She will meet with Dr. Delgado and plastic surgery to discuss surgical options.  I will be in touch with her with the results of her breast imaging once they become available.  She has been encouraged to contact the office with any questions/concerns prior to her next appointment.    I discussed Ms. Marysol Maya's breast cancer risk evaluation with her over a 30 minute encounter, more than 50% of which was devoted to the discussion of management options.

## 2024-09-24 NOTE — TELEPHONE ENCOUNTER
Left voicemail for patient regarding breast MRI.  Office phone number provided for call back.  [employer] message sent as well

## 2024-10-01 ENCOUNTER — TELEPHONE (OUTPATIENT)
Dept: SURGERY | Facility: CLINIC | Age: 54
End: 2024-10-01

## 2024-10-01 NOTE — TELEPHONE ENCOUNTER
Left voicemail for patient regarding breast MRI.  Office phone number number provided.  [employer] message sent.

## 2024-11-06 ENCOUNTER — HOSPITAL ENCOUNTER (OUTPATIENT)
Dept: MRI IMAGING | Facility: HOSPITAL | Age: 54
Discharge: HOME | End: 2024-11-06
Payer: COMMERCIAL

## 2024-11-06 DIAGNOSIS — Z15.01 BRCA2 POSITIVE: ICD-10-CM

## 2024-11-06 DIAGNOSIS — Z15.09 BRCA2 POSITIVE: ICD-10-CM

## 2024-11-06 PROCEDURE — A9575 INJ GADOTERATE MEGLUMI 0.1ML: HCPCS

## 2024-11-06 PROCEDURE — 77049 MRI BREAST C-+ W/CAD BI: CPT

## 2024-11-06 RX ORDER — GADOTERATE MEGLUMINE 376.9 MG/ML
15 INJECTION INTRAVENOUS
Status: COMPLETED | OUTPATIENT
Start: 2024-11-06 | End: 2024-11-06

## 2024-11-06 RX ADMIN — GADOTERATE MEGLUMINE 15 ML: 376.9 INJECTION INTRAVENOUS at 19:56:00

## 2024-12-27 ENCOUNTER — IMMUNIZATION (OUTPATIENT)
Dept: LAB | Age: 54
End: 2024-12-27
Attending: EMERGENCY MEDICINE
Payer: COMMERCIAL

## 2024-12-27 DIAGNOSIS — Z23 NEED FOR VACCINATION: Primary | ICD-10-CM

## 2024-12-27 PROCEDURE — 90480 ADMN SARSCOV2 VAC 1/ONLY CMP: CPT

## 2024-12-27 PROCEDURE — 90471 IMMUNIZATION ADMIN: CPT

## 2024-12-27 PROCEDURE — 90656 IIV3 VACC NO PRSV 0.5 ML IM: CPT

## 2024-12-30 ENCOUNTER — HOSPITAL ENCOUNTER (OUTPATIENT)
Dept: BONE DENSITY | Age: 54
Discharge: HOME | End: 2024-12-30
Attending: INTERNAL MEDICINE
Payer: COMMERCIAL

## 2024-12-30 DIAGNOSIS — Z78.0 POSTMENOPAUSE: ICD-10-CM

## 2024-12-30 PROCEDURE — 77080 DXA BONE DENSITY AXIAL: CPT | Performed by: INTERNAL MEDICINE

## 2025-01-08 ENCOUNTER — OFFICE VISIT (OUTPATIENT)
Age: 55
End: 2025-01-08
Payer: COMMERCIAL

## 2025-01-08 VITALS
OXYGEN SATURATION: 96 % | RESPIRATION RATE: 18 BRPM | SYSTOLIC BLOOD PRESSURE: 123 MMHG | TEMPERATURE: 98 F | DIASTOLIC BLOOD PRESSURE: 84 MMHG | WEIGHT: 161 LBS | HEART RATE: 80 BPM | BODY MASS INDEX: 29 KG/M2

## 2025-01-08 DIAGNOSIS — Z15.09 BRCA2 GENE MUTATION POSITIVE: ICD-10-CM

## 2025-01-08 DIAGNOSIS — Z12.31 SCREENING MAMMOGRAM, ENCOUNTER FOR: Primary | ICD-10-CM

## 2025-01-08 DIAGNOSIS — Z15.01 BRCA2 GENE MUTATION POSITIVE: ICD-10-CM

## 2025-01-08 NOTE — PATIENT INSTRUCTIONS
Dr. Kylee Delgado  Tel: [phone]  Fax: [phone] [facility]  [address] [place] [address]  [phone]     Surgery/Procedure: Bilateral prophylactic nipple vs skin sparing mastectomies(Danny) with reconstruction (Pancho)     Anesthesia:   Gen + request intraoperative pec block from anesthesia  Surgery Length:   2 hours CPT:  50252   Wire LOC:   No   Nuc Med:   No   Grecia Seed:  No       Dx & ICD-10: BRCA2 gene mutation positive (Z15.01, Z15.09)   Radiology Instructions: N/A   _______________________________________________________________________________    Someone must accompany you the day of the procedure to drive you home safely, because of anesthesia.  You will need an adult  to stay with you the first night following your surgery.  You must remove any kind of makeup, acrylic nails, lotions, powders, creams or deodorant.  [facility] ONLY: Pre-admission will give instruct you on when to take Gatorade and Tylenol/acetaminophen prior to your surgery, purchase 2 - 12oz bottles of regular Gatorade (NOT RED/SUGAR FREE). Otherwise, you may not eat or drink anything else after 11PM the night before surgery.    [facility]: You may not eat or drink anything after midnight the day of your surgery.   Wear comfortable clothing that can be easily removed.  If you wear dentures, contacts lenses, or any prosthesis, you will be asked to remove them.  Do not drink alcohol or smoke 24 hours prior to your procedure.  Bring a picture ID and your insurance card.  Covid-19 testing is no longer required before surgery unless you are experiencing symptoms such as fever, cough, congestion, etc.   The Pre-Admission Testing Department will call the day before to confirm your procedure, give you the time you need to arrive by and directions on where to go. They begin making calls after 2pm, if you are not contacted by 4pm, please call the surgeon's office listed above.  Do not take any blood  thinners at least one week prior to the procedure/surgery. This includes aspirin, baby aspirin, Ibuprofen products, herbal supplements, diet medications, vitamin E, fish oil and green tea supplements. Please check other supplements for these ingredients. *TYLENOL or acetaminophen is acceptable*  If you take Coumadin, Plavix, Xarelto, or Eliquis, please contact your prescribing physician for special instructions on how long to hold. If you take insulin contact your primary care physician for special instructions.  Our surgery scheduler, Bettina, will be contacting you to discuss surgery dates. If you have any questions related to scheduling your surgery, please reach out to her at [phone].  _____________________________________________________________________  PRE-OPERATIVE TESTING IF INDICATED BELOW  PLEASE COMPLETE ASAP (AT LEAST 14-21 DAYS PRIOR TO SURGERY)  [] CBC [] BMP [] CMP [] EKG    [] PT, PTT, INR [] Cardiac Clearance  [] H&P Medical Clearance [] Chest X-ray     Please call Central Scheduling to schedule an appointment for pre-operative labs/tests once your orders are placed @ [phone]  Does the patient have a pacemaker or ICD?                              Faxitron/Trident in OR?  [] Yes   [x] No                               [] Yes   [x] No

## 2025-01-17 NOTE — PROGRESS NOTES
[facility]    History of Present Illness:  54 year old woman, who presents for breast cancer risk evaluation related to being BRCA2+. The patient currently has not had any breast complaints at this time.  She denies specific breast lumps, nipple discharge, skin changes or other problems.  She has not had a prior history of breast disease or breast biopsy.  Most recent imaging including her bilateral screening mammogram in 2023 that was unremarkable and an MRI in 2024 that showed no suspicious enhancements.  She has undergone a laparoscopic BSO by Dr. Ayoub.  Past Medical History:  Past Medical History:    Actinic keratoses    L cheek    Allergic rhinitis    Anxiety    Anxiety disorder due to general medical condition with panic attack    Depression    Essential hypertension    Gestational diabetes (HCC)    Lipid screening    Other and unspecified hyperlipidemia    Postpartum hemorrhage (HCC)    Precancerous skin lesion    precancer lesion L side of facial cheek    Sinus disorder    per NG ([facility]) : sinus tach symptomatic       Past Surgical History:    Past Surgical History:   Procedure Laterality Date    Colonoscopy  ,     D & c      Needle biopsy right Right     Other surgical history      complications after giving birth       Gynecological History:  Pt is a   Pt was 32 years old at time of first pregnancy.    She has cumulative breastfeeding history of 8 months.  She denies any history of hormone replacement therapy   She has history of oral contraceptive use for 4 years   She denies infertility treatment to achieve pregnancy.    Medications:     cholecalciferol 25 MCG (1000 UT) Oral Tab Take 1 tablet (1,000 Units total) by mouth daily.      simvastatin 40 MG Oral Tab Take 1 tablet (40 mg total) by mouth nightly. 90 tablet 3    venlafaxine ER 75 MG Oral Capsule SR 24 Hr Take 1 capsule (75 mg total) by mouth daily. 90 capsule 3     losartan 25 MG Oral Tab Take 1 tablet (25 mg total) by mouth daily. 90 tablet 3    Calcium Carb-Cholecalciferol (CALCIUM + D3) 600-200 MG-UNIT Oral Tab Take 1 tablet by mouth daily.         Allergies:    Allergies   Allergen Reactions    Radiology Contrast Iodinated Dyes HIVES     Other reaction(s): IODINE    Lisinopril Coughing       Family History:  Family History   Problem Relation Age of Onset    Cancer Father         lung; smoker    Dementia Father     Pulmonary Disease Father     Pulmonary Disease Mother     Asthma Daughter     Asthma Other     Diabetes Paternal Grandfather     Skin cancer Brother         bcc/scc    Cancer Maternal Aunt 60        breast and ovarian cancer    Breast Cancer Maternal Aunt         60s    Genetic Disease Paternal Aunt         BRCA+    Cancer Paternal Aunt 60        breast; reportedly BRCA+    Breast Cancer Paternal Aunt         60's    Cancer Paternal Aunt 40        colon ca    Cancer Maternal Cousin Female 42    Breast Cancer Maternal Cousin Female         40's    Diabetes Other     Hypertension Other     Cancer Other     Arthritis Other     Heart Disease Other     Neurological Disorder Other     Cancer Maternal Uncle         lung ca; smoker    Genetic Disease Paternal Cousin Female         BRCA+     She is not of Ashkenazi Christian ancestry.    Social History:  History   Alcohol Use    4.0 standard drinks of alcohol/week    4 Glasses of wine per week     Comment: socially       History   Smoking Status    Never   Smokeless Tobacco    Never     Ms. Marysol Maya is  with 1 child and 3 step-children. She has 2 siblings. She is currently Employed full-time    Review of Systems:  General: The patient denies, fever, chills, night sweats, fatigue, generalized weakness, change in appetite or weight loss.    HEENT:     The patient denies eye irritation, cataracts, redness, glaucoma, yellowing of the eyes, change in vision, color blindness, or wears contacts/glasses. The patient  denies hearing loss, ringing in the ears, ear drainage, earaches, nasal congestion, nose bleeds, snoring, pain in mouth/throat, hoarseness, change in voice, facial trauma.    Respiratory:  The patient denies chronic cough, phlegm, hemoptysis, pleurisy/chest pain, pneumonia, asthma, wheezing, difficulty in breathing with exertion, emphysema, chronic bronchitis, shortness of breath or abnormal sound when breathing.     Cardiovascular:  There is no history of chest pain, chest pressure/discomfort, palpitations, irregular heartbeat, fainting or near-fainting, difficulty breathing when lying flat, SOB/Coughing at night, swelling of the legs or chest pain while walking.    Breasts:  See history of present illness    Gastrointestinal:     There is no history of difficulty or pain with swallowing, reflux symptoms, vomiting, dark or bloody stools, +constipation, yellowing of the skin, indigestion, nausea, change in bowel habits, diarrhea, abdominal pain or vomiting blood.     Genitourinary:  The patient denies frequent urination, needing to get up at night to urinate, urinary hesitancy or retaining urine, painful urination, urinary incontinence, decreased urine stream, blood in the urine or vaginal/penile discharge.    Skin:    The patient denies rash, itching, skin lesions, dry skin, change in skin color or change in moles.     Hematologic/Lymphatic:  The patient denies easily bruising or bleeding or persistent swollen glands or lymph nodes.     Musculoskeletal:  The patient denies muscle aches/pain, joint pain, stiff joints, neck pain, back pain or bone pain.    Neuropsychiatric:  There is no history of migraines or severe headaches, seizure/epilepsy, speech problems, coordination problems, trembling/tremors, fainting/black outs, dizziness, memory problems, loss of sensation/numbness, problems walking, weakness, tingling or burning in hands/feet. There is no history of abusive relationship, bipolar disorder, sleep  disturbance, +anxiety, +depression or feeling of despair.    Endocrine:    There is no history of poor/slow wound healing, weight loss/gain, fertility or hormone problems, cold intolerance, thyroid disease.     Allergic/Immunologic:  There is no history of hives, hay fever, angioedema or anaphylaxis.    /84 (BP Location: Right arm, Patient Position: Sitting, Cuff Size: adult)   Pulse 80   Temp 98.1 °F (36.7 °C) (Temporal)   Resp 18   Wt 73 kg (161 lb)   LMP 04/26/2018   SpO2 96%   BMI 28.52 kg/m²     Physical Exam:  The patient is an alert, oriented, well-nourished and  well-developed woman who appears her stated age. Her speech patterns and movements are normal. Her affect is appropriate.    HEENT: The head is normocephalic. The neck is supple. The thyroid is not enlarged and is without palpable masses/nodules. There are no palpable masses. The trachea is in the midline. Conjunctiva are clear, non-icteric.    Chest: The chest expands symmetrically. The lungs are clear to auscultation.    Heart: The rhythm is regular.  There are no murmurs, rubs, gallops or thrills.    Breasts:  Her breasts are symmetrical with a cup size 38D.  Right breast:: The skin, nipple ,and areola appear normal. There is no skin dimpling with movement of the pectoralis. There is no nipple retraction. No nipple discharge can be elicited. The parenchyma is mildly nodular. There are no dominant masses in the breast. The axillary tail is normal.  Left breast:   The skin, nipple, and areola appear normal. There is no skin dimpling with movement of the pectoralis. There is no nipple retraction. No nipple discharge can be elicited. The parenchyma is mildly nodular. There are no dominant masses in the breast. The axillary tail is normal.    Abdomen:  The abdomen is soft, flat and non tender. The liver is not enlarged. There are no palpable masses.    Lymph Nodes:  The supraclavicular, axillary and cervical regions are free of significant  lymphadenopathy.    Back: There is no vertebral column tenderness.    Skin: The skin appears normal. There are no suspicious appearing rashes or lesions.    Extremities: The extremities are without deformity, cyanosis or edema.      Assessment:  54 year old year old female with an increased risk for breast cancer based on her confirmed BRCA2 genetic mutation.    Discussion and Plan:  The patient has no evidence of any clinical concern on my review of her clinical exam and radiology findings today.  The lifetime risk for breast cancer in a BRCA2 mutation carrier is up to 87%. There is also up to a 30% lifetime risk of ovarian cancer in BRCA2 mutation carrier. Other cancer risks in carriers include up to a 7% lifetime risk for pancreatic cancer and, in male carriers, an increased risk of prostate cancer and up to a 7% lifetime risk of male breast cancer. It was explained that these are generalized risks for BRCA2 carriers but that some BRCA2 mutations may be associated with a higher or lower risk for these cancers than others and/or cancer risks can be modified by other genetic variants present in the family. Cancer risks associated with one particular BRCA2 mutation over another are not currently known.   For female BRCA1/2 deleterious mutation carriers the following is recommended and was discussed with the patient:   1. Breast awareness starting at age 18 years. This includes developing a familiarity with her breasts, promptly reporting breast changes to their health care provider, and periodic and consistent breast self exam. Premenopausal women may find that self exam is most informative when performed at the end of menses.   2. Clinical breast exam by a health care provider every 6 months beginning at the age of 25 years.   3. Annual mammography and breast MRI screening beginning at age 25 years such that imaging is performed every 6 months. Screening may start earlier based upon family history.   4. Consider  risk-reducing prophylactic mastectomy.   5. Risk-reducing salpingo-oophorectomy by a gynecologic oncologist, ideally between 35-40 years, and upon completion of child bearing or individualized based on earliest age of onset of ovarian cancer in the family.   6. For those who have elected against risk-reducing salpingo-oophorectomy, concurrent transvaginal ultrasound (preferably day 1-10 of menstrual cycle in premenopausal women) +  (preferably after day 5 of menstrual cycle in premenopausal women) every 6 months starting at age 30 years or 5-10 years before the earliest age of first diagnosis of ovarian cancer in the family.   7. Consider chemopreventative strategies for breast and ovarian cancer such as tamoxifen, raloxifene, exemestane for breast cancer and oral contraceptive medication (controversial) for ovarian cancer.     Patient is interested in proceeding with risk reducing prophylactic bilateral mastectomies.  Risks and possible complications of the procedure were discussed at length.  She will be meeting with plastic surgery to discuss her reconstructive options.  She will be due for her bilateral screening in April 2025 as well as her next MRI in November 2025 if she does not plan for risk reducing surgery prior. She has been encouraged to contact the office with any questions/concerns prior to her next appointment.

## 2025-03-07 ENCOUNTER — OFFICE VISIT (OUTPATIENT)
Facility: CLINIC | Age: 55
End: 2025-03-07
Payer: COMMERCIAL

## 2025-03-07 VITALS — BODY MASS INDEX: 29.19 KG/M2 | HEIGHT: 62 IN | WEIGHT: 158.63 LBS

## 2025-03-07 DIAGNOSIS — Z15.09 BRCA2 GENE MUTATION POSITIVE: Primary | ICD-10-CM

## 2025-03-07 DIAGNOSIS — Z15.01 BRCA2 GENE MUTATION POSITIVE: Primary | ICD-10-CM

## 2025-03-07 NOTE — CONSULTS
New Patient Consultation    Chief Complaint: BRCA2 genetic mutation     History of Present Illness:   Marysol Maya is a 54 year old female referred by Dr. Delgado with a BRCA2 genetic mutation. She is interested in bilateral mastectomy and is here today to discuss reconstructive options. Most recent imaging including her bilateral screening mammogram in November 2023 that was unremarkable and an MRI in November 2024 that showed no suspicious enhancements.     Past Medical History:      Past Medical History:    Actinic keratoses    L cheek    Allergic rhinitis    Anxiety    Anxiety disorder due to general medical condition with panic attack    Depression    Essential hypertension    Gestational diabetes (HCC)    Lipid screening    Other and unspecified hyperlipidemia    Postpartum hemorrhage (HCC)    Precancerous skin lesion    precancer lesion L side of facial cheek    Sinus disorder    per NG ([facility]) : sinus tach symptomatic         Past Surgical History:  Past Surgical History:   Procedure Laterality Date    Colonoscopy  6/13, 7/18    D & c      Needle biopsy right Right 2019    Other surgical history      complications after giving birth         Medications:    No outpatient medications have been marked as taking for the 3/7/25 encounter (Appointment) with Livia Deleon MD.         Allergies:    Allergies[1]      Family History:   Family History   Problem Relation Age of Onset    Cancer Father         lung; smoker    Dementia Father     Pulmonary Disease Father     Pulmonary Disease Mother     Asthma Daughter     Asthma Other     Diabetes Paternal Grandfather     Skin cancer Brother         bcc/scc    Cancer Maternal Aunt 60        breast and ovarian cancer    Breast Cancer Maternal Aunt         60s    Genetic Disease Paternal Aunt         BRCA+    Cancer Paternal Aunt 60        breast; reportedly BRCA+    Breast Cancer Paternal Aunt         60's    Cancer Paternal Aunt 40        colon ca    Cancer Maternal  Cousin Female 42    Breast Cancer Maternal Cousin Female         40's    Diabetes Other     Hypertension Other     Cancer Other     Arthritis Other     Heart Disease Other     Neurological Disorder Other     Cancer Maternal Uncle         lung ca; smoker    Genetic Disease Paternal Cousin Female         BRCA+         Social History:  History   Alcohol Use    4.0 standard drinks of alcohol/week    4 Glasses of wine per week     Comment: socially       History   Smoking Status    Never   Smokeless Tobacco    Never       History   Drug Use No       Review of Systems:    A 13 point review of systems was performed on the intake sheet.  The patient reports   General:   The patient denies, fever, chills, night sweats, fatigue, generalized weakness, change in appetite, weight loss, or weight gain.  Endocrine:   There is no history of poor/slow wound healing, weight loss/gain, fertility or hormone problems, cold intolerance, heat intolerance, excessive thirst, or thyroid disease.   Allergic/Immunologic:  There is no history of hives, hay fever, angioedema or anaphylaxis.  HEENT:    The patient denies ear pain, ear drainage, hearing loss, change in vision, double vision, cataracts, glaucoma, nasal congestion, nosebleed, hoarseness, sore throat, or swollen glands  Respiratory:   The patient denies shortness of breath, cough, bloody cough, phlegm, asthma, or wheezing  Cardiovascular:  The patient denies chest pain/pressure, palpitations, irregular heartbeat, high blood pressure, stroke, or leg swelling  Breasts:  Patient denies breast masses, pain, change in the breast skin, skin dimpling, nipple discharge, or rash  Gastrointestinal:   There is no history of difficulty or pain with swallowing, reflux symptoms, nausea, vomiting, dark/ bloody stools, diarrhea, +constipation,  change in bowel habits, or abdominal pain.   Genitourinary:  The patient denies frequent urination, needing to get up at night to urinate, urinary hesitancy  or retaining urine, painful urination, urinary incontinence, decreased urine stream, blood in the urine, or vaginal/penile discharge.  Skin:   The patient denies +rash, itching, skin lesions, +dry skin, change in skin color or change in moles, sunburns, or sunburns with blistering.   Hematologic/Lymphatic:  The patient denies easily bruising or bleeding, persistent swollen glands or lymph nodes, bleeding disorders, blood clots, or pulmonary embolism.   Gynecologic:  The patient denies irregular menses, pelvic pain, pain with intercourse, painful menses, or pregnancy  Musculoskeletal:  The patient denies muscle aches/pain, joint pain, stiff joints, neck pain, back pain or bone pain.  Neurologic:  There is no history of migraines or severe headaches, seizure/epilepsy, speech problems, coordination problems, trembling/tremors, fainting/black outs, dizziness, memory problems, loss of sensation/numbness, problems walking, weakness, tingling or burning in hands/feet.   Psychiatric:  There is no history of abusive relationship, bipolar disorder, sleep disturbance, anxiety, depression or feeling of despair.    Physical Exam:    Ht 1.575 m (5' 2\")   Wt 71.9 kg (158 lb 9.6 oz)   LMP 04/26/2018   BMI 29.01 kg/m²     Constitutional: The patient is an alert, oriented and well-developed.     Neurologic: Speech patterns and movements are normal.     Psychiatric: Affect is appropriate.    Eyes: Conjunctiva are clear, non-icteric. PERRL    ENT: no obvious abnormality, no ear drainage, mucous membranes moist and pink    Integument/Skin: The skin appears normal. There are no suspicious appearing rashes or lesions.    Breasts: 40 DD bra size  Sternal notch to nipple distance: 28 cm left, 27 cm right  Base diameter 15 cm bilaterally  Nipple to IMF distance: 10 cm left, 11 cm right    Respiratory: Normal respiratory effort.     Cardiovascular: no cyanosis, no edema    Musculoskeletal: Extremities unremarkable, without edema,  tenderness or deformities    Impression:   Marysol Maya  is a 54 year old with BRCA2 genetic mutation    Discussion and Plan:  The patient was counseled on the different treatment options.     We reviewed the options for post-mastectomy reconstruction and discussed the risks/benefits of  timing (immediate versus delayed) and technique (implant-based versus autologous).     Specifically, the nature and technique of tissue expander reconstruction was reviewed with the patient. We discussed the prepectoral versus subpectoral placement of the tissue expander, use of acellular matrix (off label use, FDA approval for soft tissue reinforcement, not breast specific), and placement of drains. The expansion process, as well as the need for a secondary procedure for placement of a permanent implant,  were reviewed.    The risks of surgery including but not limited to bleeding, infection, scarring, delayed wound healing, mastectomy flap necrosis, nipple necrosis, seroma, asymmetry, implant infection/extrusion requiring removal, injury to adjacent structures, capsular contracture, FATMATA-ALCL, FATMATA-SCC, breast implant associated illness, need for implant exchange, and need for further surgery were reviewed. The expected post-operative course was discussed including the need for activity limitation, drains, and suture removal.  The recommended FDA surveillance protocol for silicone implants was reviewed.  Finally, we reviewed the impact of post-mastectomy radiation therapy on implant-based reconstruction (should it be deemed necessary based on the final pathology results), including increased risk of reconstructive loss and capsular contracture.     In addition, the nature and technique of breast reconstruction with abdominal free flap was was reviewed with the patient. We reviewed the variations of abdominal free flap reconstruction including MARLENY flap, SIEA flap, muscle-sparing free TRAM flap, and free TRAM flap. The risks of  surgery including but not limited to bleeding, infection, scarring, seroma, delayed wound healing, partial/total flap loss, fat necrosis, asymmetry, injury to adjacent structures, abdominal hernia/weakness/bulge, need for further surgery, and venous thromboembolism  reviewed. The expected post-operative course was discussed including the need for activity limitation, drains, and suture removal. We reviewed the impact of post-mastectomy radiation therapy on flap-based reconstruction (should it be deemed necessary based on the final pathology results) including flap fibrosis, shrinkage, and fat necrosis.      Finally, we discussed the possible need for revisions, the process of nipple areolar reconstruction, and a contralateral balancing procedure (augmentation, mastopexy, reduction).   Multiple questions were answered to the patient's satisfaction. No guarantees as to outcome offered.      She would like to be smaller. She is interested  in bilateral prophylactic mastectomy with Dr. Delgado. Given her anatomy and reconstructive desires, I think skin sparing mastectomy will be the best option.    We discussed proceeding with immediate bilateral breast reconstruction with tissue expander placement, acellular dermal matrix, wise pattern local tissue rearrangement ([place])at the time of mastectomy.     She is hoping to do this in August 2025.    45 minutes were spent with the patient, from which 35 minutes were spent counseling the patient and coordinating care.         [1]   Allergies  Allergen Reactions    Radiology Contrast Iodinated Dyes HIVES     Other reaction(s): IODINE    Lisinopril Coughing

## 2025-03-07 NOTE — PATIENT INSTRUCTIONS
Surgeon:              Dr. Livia Deleon, PhD                                          Tel:         [phone]                                  Fax:        [phone]      Surgery/Procedure: Immediate bilateral breast reconstruction with tissue expander placement, acellular dermal matrix, wise pattern local tissue rearrangement   3 hours for Dr. Deleon's portion, general anesthesia, request presurgical intraoperative pec block, joint with Dr. Delgado, outpatient, August 2025, patient prefers [facility]    Dx Code:  [Z15.01, Z15.09]     Hospital:  [facility]: [address] [place] [address]           [phone]  [facility]: [address], [place] [address]               [phone]    1. Someone will need to drive you to and from the hospital if your procedure is outpatient.    2.Do not drink alcohol or smoke 24 hours prior to your procedure.    3. Bring a picture ID and your insurance card.    4. You will be contacted by the hospital the day before to confirm the procedure time and location.     5. Do not take any herbal supplements or blood thinners at least one week before your procedure/surgery. This includes NSAID's (aspirin, baby aspirin, Motrin, Ibuprofen, Aleve, Advil, Naproxen, etc), fish oil, vitamin E, turmeric, CoQ10, or green tea supplements, etc. *TYLENOL or acetaminophen is ok to take*    6. PRE-OPERATIVE TESTING: History and physical with medical clearance is REQUIRED within 30 days of the surgery date and is mandatory per Dr. Deleon. *If this is not done, your surgery will be postponed*  MEDICAL CLEARANCE WITH DR. Hogan  CBC  CMP  EKG (within 90 days)    7. Please inform us if you start or change any medications at least one week before surgery (ex: blood thinners, weight loss medications, diabetic medications, herbal supplements, etc)    8. Does patient have diagnosis of sleep apnea?    [   ] Yes     [  X ]  No    Consent obtained   Photos taken on 3/7/2025

## 2025-03-10 ENCOUNTER — TELEPHONE (OUTPATIENT)
Dept: SURGERY | Facility: CLINIC | Age: 55
End: 2025-03-10

## 2025-03-10 DIAGNOSIS — Z15.09 BRCA2 GENE MUTATION POSITIVE: Primary | ICD-10-CM

## 2025-03-10 DIAGNOSIS — Z15.01 BRCA2 GENE MUTATION POSITIVE: Primary | ICD-10-CM

## 2025-03-10 NOTE — TELEPHONE ENCOUNTER
Called and lvm for patient to call us to schedule surgery for Dr. Delgado/Dr. Deleon for 08/28/2025 at [facility]

## 2025-03-11 ENCOUNTER — TELEPHONE (OUTPATIENT)
Dept: INTERNAL MEDICINE CLINIC | Facility: CLINIC | Age: 55
End: 2025-03-11

## 2025-03-11 NOTE — TELEPHONE ENCOUNTER
Received faxed Medical Clearance request for 8/28/25 surgery from [facility].     Form placed in Dr. Hogan's mailbox.

## 2025-03-12 ENCOUNTER — TELEPHONE (OUTPATIENT)
Dept: SURGERY | Facility: CLINIC | Age: 55
End: 2025-03-12

## 2025-03-12 NOTE — TELEPHONE ENCOUNTER
Patient would like a call back with a question about her surgery date. She would like to talk before she schedules the post op appointment.

## 2025-03-13 ENCOUNTER — TELEPHONE (OUTPATIENT)
Dept: SURGERY | Facility: CLINIC | Age: 55
End: 2025-03-13

## 2025-03-14 ENCOUNTER — TELEPHONE (OUTPATIENT)
Dept: SURGERY | Facility: CLINIC | Age: 55
End: 2025-03-14

## 2025-03-14 NOTE — TELEPHONE ENCOUNTER
BAUDILIOM for patient asking them to please give me a call back regarding scheduling their surgery with Dr. Delgado and Dr. Deleon

## 2025-03-14 NOTE — TELEPHONE ENCOUNTER
Patient returned call. Offered 7/24/25 at [facility], but patient declined and stated she will keep her 8/28/25 date.

## 2025-03-27 ENCOUNTER — OFFICE VISIT (OUTPATIENT)
Dept: INTERNAL MEDICINE CLINIC | Facility: CLINIC | Age: 55
End: 2025-03-27
Payer: COMMERCIAL

## 2025-03-27 VITALS
WEIGHT: 161 LBS | TEMPERATURE: 99 F | BODY MASS INDEX: 29.63 KG/M2 | HEIGHT: 62 IN | DIASTOLIC BLOOD PRESSURE: 70 MMHG | HEART RATE: 76 BPM | OXYGEN SATURATION: 97 % | SYSTOLIC BLOOD PRESSURE: 110 MMHG

## 2025-03-27 DIAGNOSIS — R09.89 RIGHT CAROTID BRUIT: ICD-10-CM

## 2025-03-27 DIAGNOSIS — Z15.09 BRCA2 GENE MUTATION POSITIVE: ICD-10-CM

## 2025-03-27 DIAGNOSIS — Z15.01 BRCA2 GENE MUTATION POSITIVE: ICD-10-CM

## 2025-03-27 DIAGNOSIS — Z00.00 ROUTINE HEALTH MAINTENANCE: ICD-10-CM

## 2025-03-27 DIAGNOSIS — E78.00 HYPERCHOLESTEROLEMIA: Primary | ICD-10-CM

## 2025-03-27 DIAGNOSIS — E55.9 VITAMIN D DEFICIENCY: ICD-10-CM

## 2025-03-27 RX ORDER — SIMVASTATIN 40 MG
40 TABLET ORAL NIGHTLY
Qty: 90 TABLET | Refills: 3 | Status: SHIPPED | OUTPATIENT
Start: 2025-03-27

## 2025-03-27 RX ORDER — LOSARTAN POTASSIUM 25 MG/1
25 TABLET ORAL DAILY
Qty: 90 TABLET | Refills: 3 | Status: SHIPPED | OUTPATIENT
Start: 2025-03-27

## 2025-03-27 RX ORDER — VENLAFAXINE HYDROCHLORIDE 75 MG/1
75 CAPSULE, EXTENDED RELEASE ORAL DAILY
Qty: 90 CAPSULE | Refills: 3 | Status: SHIPPED | OUTPATIENT
Start: 2025-03-27

## 2025-03-27 NOTE — PROGRESS NOTES
Marysol Maya is a 54 year old female.  Chief Complaint   Patient presents with    Physical     Patient is here today with PE. Last pap negative on 2/2/23. Patient states that she has been feeling good lately.        HPI:   Marysol Maya is a 54 year old female who presents for a complete physical exam.       Prophylactic MARIMAR/BSO in 8/2024  Only a few hot flashes.  Scheduled for prophylactic mastectomy (Dr. Delgado) and expander placement (Dr. Deleon) on 8/28/25 (BRCA2 mutation)  Both brothers tested positive, as did several paternal cousins    Feels well o/w      SocHx:  Still working in the schools as school psychologist.  Georgia is 20 at [employer] (senior; staying at [facility] [employer] [facility] for OT), Farida is 15  Ex- passed away in 7/2021 from [facility].  Mom passed away in 2/2020 from IPF.    Wt Readings from Last 6 Encounters:   03/27/25 161 lb (73 kg)   03/07/25 158 lb 9.6 oz (71.9 kg)   01/08/25 161 lb (73 kg)   09/23/24 159 lb (72.1 kg)   07/30/24 162 lb (73.5 kg)   07/22/24 160 lb (72.6 kg)     Body mass index is 29.45 kg/m².       Current Outpatient Medications   Medication Sig Dispense Refill    cholecalciferol 25 MCG (1000 UT) Oral Tab Take 1 tablet (1,000 Units total) by mouth daily.      simvastatin 40 MG Oral Tab Take 1 tablet (40 mg total) by mouth nightly. 90 tablet 3    venlafaxine ER 75 MG Oral Capsule SR 24 Hr Take 1 capsule (75 mg total) by mouth daily. 90 capsule 3    losartan 25 MG Oral Tab Take 1 tablet (25 mg total) by mouth daily. 90 tablet 3    Calcium Carb-Cholecalciferol (CALCIUM + D3) 600-200 MG-UNIT Oral Tab Take 1 tablet by mouth daily.        Past Medical History:    Actinic keratoses    L cheek    Allergic rhinitis    Anxiety    Anxiety disorder due to general medical condition with panic attack    Depression    Essential hypertension    Gestational diabetes (HCC)    Lipid screening    Other and unspecified hyperlipidemia    Postpartum hemorrhage (HCC)    Precancerous skin lesion    precancer  lesion L side of facial cheek    Sinus disorder    per NG ([facility]) : sinus tach symptomatic      Past Surgical History:   Procedure Laterality Date    Colonoscopy  6/13, 7/18    D & c      Needle biopsy right Right 2019    Other surgical history      complications after giving birth      Family History   Problem Relation Age of Onset    Cancer Father         lung; smoker    Dementia Father     Pulmonary Disease Father     Pulmonary Disease Mother     Asthma Daughter     Asthma Other     Diabetes Paternal Grandfather     Skin cancer Brother         bcc/scc    Cancer Maternal Aunt 60        breast and ovarian cancer    Breast Cancer Maternal Aunt         60s    Genetic Disease Paternal Aunt         BRCA+    Cancer Paternal Aunt 60        breast; reportedly BRCA+    Breast Cancer Paternal Aunt         60's    Cancer Paternal Aunt 40        colon ca    Cancer Maternal Cousin Female 42    Breast Cancer Maternal Cousin Female         40's    Diabetes Other     Hypertension Other     Cancer Other     Arthritis Other     Heart Disease Other     Neurological Disorder Other     Cancer Maternal Uncle         lung ca; smoker    Genetic Disease Paternal Cousin Female         BRCA+    Asthma Daughter       Social History:   Social History     Socioeconomic History    Marital status:    Tobacco Use    Smoking status: Never     Passive exposure: Yes    Smokeless tobacco: Never   Vaping Use    Vaping status: Never Used   Substance and Sexual Activity    Alcohol use: Yes     Alcohol/week: 4.0 standard drinks of alcohol     Types: 4 Glasses of wine per week     Comment: socially    Drug use: No   Other Topics Concern    Pt has a pacemaker No    Pt has a defibrillator No    Reaction to local anesthetic No    Caffeine Concern Yes     Comment: Coffee 1-3 cups daily          REVIEW OF SYSTEMS:   GENERAL: feels well otherwise  SKIN: denies any unusual skin lesions  EYES:denies blurred vision or double vision  HEENT: denies nasal  congestion, sinus pain or ST  LUNGS: denies shortness of breath with exertion or cough  CARDIOVASCULAR: denies chest pain, pressure, or palpitations  GI: denies abdominal pain, nausea, vomiting, diarrhea, constipation, hematochezia, or melena  NEURO: denies headaches or dizziness    EXAM:   /70 (BP Location: Right arm, Patient Position: Sitting, Cuff Size: adult)   Pulse 76   Temp 98.6 °F (37 °C) (Oral)   Ht 5' 2\" (1.575 m)   Wt 161 lb (73 kg)   LMP 04/26/2018   SpO2 97%   BMI 29.45 kg/m²     GENERAL: well developed, well nourished, in no apparent distress  HEENT: normal oropharynx, normal TM's  EYES: PERRLA, EOMI, conjunctivae are pink  NECK: supple, no cervical or supraclavicular LAD, +R carotid bruit  BREAST: no dominant or suspicious mass, no axillary LAD  LUNGS: clear to auscultation  CARDIO: RRR, normal S1S2, no gallops or murmurs  GI: soft, NT, ND, NABS, no HSM  EXTREMITIES: no cyanosis, clubbing or edema, +2 DP pulses      ASSESSMENT AND PLAN:     Routine health maintenance  Breast MRI neg 11/2024  DEXA normal 12/30/24  Pap/HPV negative 2/2/23  Sees derm for annual exam  Had Shingrix vaccine 2019.   Tdap 8/22/19  Colonoscopy 6/27/24 (Dr. Myers) - normal; repeat in 5 years (6/2029)    BRCA 2 mutation  -family hx of breast cancer (paternal aunt w/breast cancer x 2 -- aunt's daughter (pt's cousin) with BRCA2 mutation; another paternal cousin also with BRCA2 mutation.  Maternal cousin with breast ca in her 40's.  Maternal aunt with breast cancer.  Other maternal aunt with ovarian cancer -- but no genetic testing done on maternal side)  -tested positive for BRCA2 mutation (saw  Rebekah Martinez)  -s/p prophylactic b/l lap oophorectomy/salpingectomy on 8/8/24 with Dr. Ayoub at [facility]  -no family hx of pancreatic cancer, so no definite indication for surveillance  -discussed increased risk of melanoma;rec annual ALETA with derm Dr. Mini Puri  -[facility] on colonoscopy -- normal colonoscopy 6/27/24  (Dr. Myers) - advised for repeat in 5 years (6/2029) given BRCA2 and family hx of adenomatous polyps      Essential hypertension  -on losartan 25mg/day  -(lisinopril caused cough)  -BP well-controlled    Hypercholesterolemia  -on Simvastatin 20mg/day  -lipids at goal (done thru work 3/13/24)  -LDL 98, HDL 67, TG 64     Vitamin D deficiency  Taking calcium w/D and extra vitamin D 1000u/day    Anxiety  Stable on Effexor.    Chronic constipation  Sees GI, Dr. Myers; sx improved with keto diet.     Headache,positional  -pressure behind her eyes (R>L)  -started in 1/2023; positional (worse when prone, but also when supine -- worse on the side that she turns her head)  -normal MRI brain 6/2023  -saw neuro Dr. Mitchell -- eval negative  -HA's are intermittent - may go months between sx.  Worse when congested.  -d/w pt that it may be sinus-related; recommend daily flonase    R carotid bruit  -check carotid u/s           RTC 1 yr.

## 2025-03-28 ENCOUNTER — APPOINTMENT (OUTPATIENT)
Dept: GENERAL RADIOLOGY | Age: 55
End: 2025-03-28
Attending: EMERGENCY MEDICINE
Payer: COMMERCIAL

## 2025-03-28 ENCOUNTER — HOSPITAL ENCOUNTER (OUTPATIENT)
Age: 55
Discharge: HOME | End: 2025-03-28
Attending: EMERGENCY MEDICINE
Payer: COMMERCIAL

## 2025-03-28 VITALS
DIASTOLIC BLOOD PRESSURE: 90 MMHG | RESPIRATION RATE: 12 BRPM | SYSTOLIC BLOOD PRESSURE: 133 MMHG | TEMPERATURE: 99 F | HEART RATE: 115 BPM | OXYGEN SATURATION: 96 %

## 2025-03-28 DIAGNOSIS — J18.9 PNEUMONIA OF RIGHT LUNG DUE TO INFECTIOUS ORGANISM, UNSPECIFIED PART OF LUNG: Primary | ICD-10-CM

## 2025-03-28 LAB
POCT INFLUENZA A: NEGATIVE
POCT INFLUENZA B: NEGATIVE
S PYO AG THROAT QL IA.RAPID: NEGATIVE
SARS-COV-2 RNA RESP QL NAA+PROBE: NOT DETECTED

## 2025-03-28 PROCEDURE — 99214 OFFICE O/P EST MOD 30 MIN: CPT

## 2025-03-28 PROCEDURE — 71046 X-RAY EXAM CHEST 2 VIEWS: CPT | Performed by: EMERGENCY MEDICINE

## 2025-03-28 PROCEDURE — 87651 STREP A DNA AMP PROBE: CPT | Performed by: EMERGENCY MEDICINE

## 2025-03-28 PROCEDURE — 87502 INFLUENZA DNA AMP PROBE: CPT | Performed by: EMERGENCY MEDICINE

## 2025-03-28 RX ORDER — BENZONATATE 100 MG/1
100 CAPSULE ORAL 3 TIMES DAILY PRN
Qty: 30 CAPSULE | Refills: 0 | Status: SHIPPED | OUTPATIENT
Start: 2025-03-28 | End: 2025-04-27

## 2025-03-28 RX ORDER — AZITHROMYCIN 250 MG/1
TABLET, FILM COATED ORAL
Qty: 6 TABLET | Refills: 0 | Status: SHIPPED | OUTPATIENT
Start: 2025-03-28 | End: 2025-04-02

## 2025-03-28 NOTE — ED INITIAL ASSESSMENT (HPI)
Patient with cough since Tuesday.  + headache, sore throat, congestion as well.  Denies fevers.

## 2025-03-28 NOTE — DISCHARGE INSTRUCTIONS
Augmentin, Zithromax as prescribed.  Cough suppression with Tessalon.  Go to the emergency room immediately for any chest pain or shortness of breath.   Mother called in requesting an ear nfection follow up. Reports abx should be completed on Thurs however patient complaining of R ear and not being able to hear out of it. Mom asking about flushing ear out. Requests to be seen sooner than next avail as patient goes to father's this weekend and the hearing concern is important to mom. Call back number confirmed.

## 2025-03-28 NOTE — ED PROVIDER NOTES
Patient Seen in: [facility]      History     Chief Complaint   Patient presents with    Cough     Sore throat, headache, congestion since Tuesday. - Entered by patient     Stated Complaint: Cough - Sore throat, headache, congestion since Tuesday.    Subjective:   HPI      Patient is a 54-year-old female with past history of hypertension, hyperlipidemia who presents now with cough, congestion, sore throat.  Patient states the symptoms started approximately 3 days ago.  Patient describes nasal congestion and sore throat associated with minimally productive cough.  Patient denies any fever.  The patient denies any chest pain or shortness of breath.  Patient states she has been taking over-the-counter cough medications with minimal improvement.  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    Objective:     Past Medical History:    Actinic keratoses    L cheek    Allergic rhinitis    Anxiety    Anxiety disorder due to general medical condition with panic attack    Depression    Essential hypertension    Gestational diabetes (HCC)    Lipid screening    Other and unspecified hyperlipidemia    Postpartum hemorrhage (HCC)    Precancerous skin lesion    precancer lesion L side of facial cheek    Sinus disorder    per NG ([facility]) : sinus tach symptomatic              Past Surgical History:   Procedure Laterality Date    Colonoscopy  6/13, 7/18    D & c      Needle biopsy right Right 2019    Other surgical history      complications after giving birth                Social History     Socioeconomic History    Marital status:    Tobacco Use    Smoking status: Never     Passive exposure: Yes    Smokeless tobacco: Never   Vaping Use    Vaping status: Never Used   Substance and Sexual Activity    Alcohol use: Yes     Alcohol/week: 4.0 standard drinks of alcohol     Types: 4 Glasses of wine per week     Comment: socially    Drug use: No   Other Topics Concern    Pt has a pacemaker No     Pt has a defibrillator No    Reaction to local anesthetic No    Caffeine Concern Yes     Comment: Coffee 1-3 cups daily              Review of Systems    Positive for stated complaint: Cough - Sore throat, headache, congestion since Tuesday.  Other systems are as noted in HPI.  Constitutional and vital signs reviewed.      All other systems reviewed and negative except as noted above.    Physical Exam     ED Triage Vitals [03/28/25 0841]   /90   Pulse 115   Resp 12   Temp 98.5 °F (36.9 °C)   Temp src Oral   SpO2 96 %   O2 Device None (Room air)       Current Vitals:   Vital Signs  BP: 133/90  Pulse: 115  Resp: 12  Temp: 98.5 °F (36.9 °C)  Temp src: Oral    Oxygen Therapy  SpO2: 96 %  O2 Device: None (Room air)        Physical Exam    Constitutional: Well-developed well-nourished in no acute distress  Head: Normocephalic, no swelling or tenderness  Eyes: Nonicteric sclera, no conjunctival injection  ENT: TMs are clear and flat bilaterally.  There is no posterior pharyngeal erythema  Chest: Clear to auscultation, no tenderness  Cardiovascular: Regular rate and rhythm without murmur; heart rate approximately 100 on exam  Abdomen: Soft, nontender and nondistended  Neurologic: Patient is awake, alert and oriented ×3.  The patient's motor strength is 5 out of 5 and symmetric in the upper and lower extremities bilaterally  Extremities: No focal swelling or tenderness  Skin: No pallor, no redness or warmth to the touch      ED Course     Labs Reviewed   POCT FLU TEST - Normal    Narrative:     This assay is a rapid molecular in vitro test utilizing nucleic acid amplification of influenza A and B viral RNA.   RAPID SARS-COV-2 BY PCR - Normal   RAPID STREP A - Normal     Pulse ox is 96% on room air.   Chest x-ray was independently reviewed by myself.  There is patchy vague opacity in the right middle and anterior segment of the right upper lobe    Patient's negative strep, negative flu, negative COVID were reviewed  with the patient.  The patient's abnormal chest x-ray was reviewed.  Will initiate Augmentin, Zithromax for possible pneumonia.  Patient denies any chest pain or shortness of breath.  The patient to go to the emergency room for any worsening symptoms was reviewed with the patient.       MDM      Viral URI versus influenza versus pneumonia        Medical Decision Making      Disposition and Plan     Clinical Impression:  1. Pneumonia of right lung due to infectious organism, unspecified part of lung         Disposition:  Discharge  3/28/2025  9:13 am    Follow-up:  Veena Hogan MD  [address] [place] [id number]  [phone]    In 3 days  If symptoms worsen.  Go to the emergency room immediately for any chest pain or shortness of breath          Medications Prescribed:  Current Discharge Medication List        START taking these medications    Details   azithromycin (ZITHROMAX Z-ALAINA) 250 MG Oral Tab 500 mg once followed by 250 mg daily x 4 days  Qty: 6 tablet, Refills: 0      amoxicillin clavulanate 875-125 MG Oral Tab Take 1 tablet by mouth 2 (two) times daily for 10 days.  Qty: 20 tablet, Refills: 0      benzonatate 100 MG Oral Cap Take 1 capsule (100 mg total) by mouth 3 (three) times daily as needed for cough.  Qty: 30 capsule, Refills: 0                 Supplementary Documentation:

## 2025-04-02 ENCOUNTER — TELEPHONE (OUTPATIENT)
Dept: INTERNAL MEDICINE CLINIC | Facility: CLINIC | Age: 55
End: 2025-04-02

## 2025-04-02 ENCOUNTER — E-VISIT (OUTPATIENT)
Dept: TELEHEALTH | Age: 55
End: 2025-04-02
Payer: COMMERCIAL

## 2025-04-02 DIAGNOSIS — J18.9 PNEUMONIA DUE TO INFECTIOUS ORGANISM, UNSPECIFIED LATERALITY, UNSPECIFIED PART OF LUNG: Primary | ICD-10-CM

## 2025-04-02 DIAGNOSIS — R05.1 ACUTE COUGH: ICD-10-CM

## 2025-04-02 DIAGNOSIS — R05.1 ACUTE COUGH: Primary | ICD-10-CM

## 2025-04-02 PROCEDURE — 99421 OL DIG E/M SVC 5-10 MIN: CPT | Performed by: NURSE PRACTITIONER

## 2025-04-02 RX ORDER — CODEINE PHOSPHATE AND GUAIFENESIN 10; 100 MG/5ML; MG/5ML
5 SOLUTION ORAL EVERY 6 HOURS PRN
Qty: 237 ML | Refills: 0 | Status: SHIPPED
Start: 2025-04-02

## 2025-04-02 NOTE — PROGRESS NOTES
Marysol Maya is a 54 year old female.  HPI:   See answers to questions above.     Current Outpatient Medications   Medication Sig Dispense Refill    guaiFENesin-codeine 100-10 MG/5ML Oral Solution Take 5 mL by mouth every 6 (six) hours as needed for cough. 237 mL 0    azithromycin (ZITHROMAX Z-ALAINA) 250 MG Oral Tab 500 mg once followed by 250 mg daily x 4 days 6 tablet 0    amoxicillin clavulanate 875-125 MG Oral Tab Take 1 tablet by mouth 2 (two) times daily for 10 days. 20 tablet 0    benzonatate 100 MG Oral Cap Take 1 capsule (100 mg total) by mouth 3 (three) times daily as needed for cough. 30 capsule 0    losartan 25 MG Oral Tab Take 1 tablet (25 mg total) by mouth daily. 90 tablet 3    simvastatin 40 MG Oral Tab Take 1 tablet (40 mg total) by mouth nightly. 90 tablet 3    venlafaxine ER 75 MG Oral Capsule SR 24 Hr Take 1 capsule (75 mg total) by mouth daily. 90 capsule 3    cholecalciferol 25 MCG (1000 UT) Oral Tab Take 1 tablet (1,000 Units total) by mouth daily.      Calcium Carb-Cholecalciferol (CALCIUM + D3) 600-200 MG-UNIT Oral Tab Take 1 tablet by mouth daily.        Past Medical History:    Actinic keratoses    L cheek    Allergic rhinitis    Anxiety    Anxiety disorder due to general medical condition with panic attack    Depression    Essential hypertension    Gestational diabetes (HCC)    Lipid screening    Other and unspecified hyperlipidemia    Postpartum hemorrhage (HCC)    Precancerous skin lesion    precancer lesion L side of facial cheek    Sinus disorder    per NG ([facility]) : sinus tach symptomatic      Past Surgical History:   Procedure Laterality Date    Colonoscopy  6/13, 7/18    D & c      Needle biopsy right Right 2019    Other surgical history      complications after giving birth      Family History   Problem Relation Age of Onset    Cancer Father         lung; smoker    Dementia Father     Pulmonary Disease Father     Pulmonary Disease Mother     Asthma Daughter     Asthma Other      Diabetes Paternal Grandfather     Skin cancer Brother         bcc/scc    Cancer Maternal Aunt 60        breast and ovarian cancer    Breast Cancer Maternal Aunt         60s    Genetic Disease Paternal Aunt         BRCA+    Cancer Paternal Aunt 60        breast; reportedly BRCA+    Breast Cancer Paternal Aunt         60's    Cancer Paternal Aunt 40        colon ca    Cancer Maternal Cousin Female 42    Breast Cancer Maternal Cousin Female         40's    Diabetes Other     Hypertension Other     Cancer Other     Arthritis Other     Heart Disease Other     Neurological Disorder Other     Cancer Maternal Uncle         lung ca; smoker    Genetic Disease Paternal Cousin Female         BRCA+    Asthma Daughter       Social History:  Social History     Socioeconomic History    Marital status:    Tobacco Use    Smoking status: Never     Passive exposure: Yes    Smokeless tobacco: Never   Vaping Use    Vaping status: Never Used   Substance and Sexual Activity    Alcohol use: Yes     Alcohol/week: 4.0 standard drinks of alcohol     Types: 4 Glasses of wine per week     Comment: socially    Drug use: No   Other Topics Concern    Pt has a pacemaker No    Pt has a defibrillator No    Reaction to local anesthetic No    Caffeine Concern Yes     Comment: Coffee 1-3 cups daily         ASSESSMENT AND PLAN:     Encounter Diagnosis   Name Primary?    Acute cough Yes   Pt treated at  on 3/28 for pneumonia. Reports worsening cough. Advised in person follow up for further evaluation.         Meds & Refills for this Visit:  Requested Prescriptions      No prescriptions requested or ordered in this encounter       Duration of  the service:  5 minutes    Patient advised to follow up with PCP if no improvement or worsening of symptoms  Refer to [facility] [employer] message for specific patient instructions

## 2025-04-02 NOTE — TELEPHONE ENCOUNTER
Called pt. She is feeling better but having coughing fits any time she tries to talk or get up to do something. Denies f/c, dyspnea. Cough is dry and her only symptom at this time. Benzonatate is not helping. Rx robitussin AC PRN. Advised to call back if sx worsen or if she develops a fever. Pt verbalized understanding and agreed to plan. All questions were answered.

## 2025-04-02 NOTE — TELEPHONE ENCOUNTER
Please advise - called patient who was seen in  3/28 -Pneumonia   She is still coughing - Benzonatate not helping much - still taking Augmentin -finished z-precious   Also still fatigued - is there anything else for cough?  To

## 2025-04-02 NOTE — TELEPHONE ENCOUNTER
Patient left the following voicemail message  She was ssen in  last Friday  Diagnosed with pneumonia   Still has a cough and medicine she's taking for the cough is not helping  Hoping to be seen today or tomorrow  Does not want to go back to [facility]    Requests call back to advise [phone]

## 2025-04-19 ENCOUNTER — PATIENT MESSAGE (OUTPATIENT)
Dept: INTERNAL MEDICINE CLINIC | Facility: CLINIC | Age: 55
End: 2025-04-19

## 2025-04-21 ENCOUNTER — TELEPHONE (OUTPATIENT)
Dept: INTERNAL MEDICINE CLINIC | Facility: CLINIC | Age: 55
End: 2025-04-21

## 2025-04-21 NOTE — TELEPHONE ENCOUNTER
Marysol Maya to [facility] Clinical Staff (supporting Veena Hogan MD) (Selected Message)        4/19/25 12:17 PM  Hi Dr. Hogan,     I tried to schedule my bloodwork that you ordered as a follow up to my appointment but it won’t let me. Is the order for now or for my surgery in August?       Thanks!  Marysol

## 2025-05-06 ENCOUNTER — LAB ENCOUNTER (OUTPATIENT)
Dept: LAB | Age: 55
End: 2025-05-06
Attending: SURGERY
Payer: COMMERCIAL

## 2025-05-06 ENCOUNTER — HOSPITAL ENCOUNTER (OUTPATIENT)
Dept: ULTRASOUND IMAGING | Facility: HOSPITAL | Age: 55
Discharge: HOME | End: 2025-05-06
Attending: INTERNAL MEDICINE
Payer: COMMERCIAL

## 2025-05-06 ENCOUNTER — HOSPITAL ENCOUNTER (OUTPATIENT)
Dept: MAMMOGRAPHY | Facility: HOSPITAL | Age: 55
Discharge: HOME | End: 2025-05-06
Attending: SURGERY
Payer: COMMERCIAL

## 2025-05-06 ENCOUNTER — TELEPHONE (OUTPATIENT)
Dept: INTERNAL MEDICINE CLINIC | Facility: CLINIC | Age: 55
End: 2025-05-06

## 2025-05-06 DIAGNOSIS — Z15.01 BRCA2 GENE MUTATION POSITIVE: ICD-10-CM

## 2025-05-06 DIAGNOSIS — E78.00 HYPERCHOLESTEROLEMIA: ICD-10-CM

## 2025-05-06 DIAGNOSIS — E55.9 VITAMIN D DEFICIENCY: ICD-10-CM

## 2025-05-06 DIAGNOSIS — Z12.31 SCREENING MAMMOGRAM, ENCOUNTER FOR: ICD-10-CM

## 2025-05-06 DIAGNOSIS — Z15.09 BRCA2 GENE MUTATION POSITIVE: ICD-10-CM

## 2025-05-06 DIAGNOSIS — Z00.00 ROUTINE HEALTH MAINTENANCE: ICD-10-CM

## 2025-05-06 DIAGNOSIS — R09.89 RIGHT CAROTID BRUIT: ICD-10-CM

## 2025-05-06 LAB
ALBUMIN SERPL-MCNC: 4.8 G/DL (ref 3.2–4.8)
ALBUMIN/GLOB SERPL: 1.8 {RATIO} (ref 1–2)
ALP LIVER SERPL-CCNC: 58 U/L (ref 41–108)
ALT SERPL-CCNC: 18 U/L (ref 10–49)
ANION GAP SERPL CALC-SCNC: 9 MMOL/L (ref 0–18)
AST SERPL-CCNC: 25 U/L (ref ?–34)
BASOPHILS # BLD AUTO: 0.09 X10(3) UL (ref 0–0.2)
BASOPHILS NFR BLD AUTO: 1.6 %
BILIRUB SERPL-MCNC: 0.6 MG/DL (ref 0.3–1.2)
BUN BLD-MCNC: 16 MG/DL (ref 9–23)
BUN/CREAT SERPL: 18.8 (ref 10–20)
CALCIUM BLD-MCNC: 9.8 MG/DL (ref 8.7–10.4)
CHLORIDE SERPL-SCNC: 102 MMOL/L (ref 98–112)
CHOLEST SERPL-MCNC: 210 MG/DL (ref ?–200)
CO2 SERPL-SCNC: 30 MMOL/L (ref 21–32)
CREAT BLD-MCNC: 0.85 MG/DL (ref 0.55–1.02)
DEPRECATED RDW RBC AUTO: 48 FL (ref 35.1–46.3)
EGFRCR SERPLBLD CKD-EPI 2021: 81 ML/MIN/1.73M2 (ref 60–?)
EOSINOPHIL # BLD AUTO: 0.19 X10(3) UL (ref 0–0.7)
EOSINOPHIL NFR BLD AUTO: 3.4 %
ERYTHROCYTE [DISTWIDTH] IN BLOOD BY AUTOMATED COUNT: 13.4 % (ref 11–15)
FASTING PATIENT LIPID ANSWER: YES
FASTING STATUS PATIENT QL REPORTED: YES
GLOBULIN PLAS-MCNC: 2.7 G/DL (ref 2–3.5)
GLUCOSE BLD-MCNC: 113 MG/DL (ref 70–99)
HCT VFR BLD AUTO: 40.6 % (ref 35–48)
HDLC SERPL-MCNC: 70 MG/DL (ref 40–59)
HGB BLD-MCNC: 12.9 G/DL (ref 12–16)
IMM GRANULOCYTES # BLD AUTO: 0.04 X10(3) UL (ref 0–1)
IMM GRANULOCYTES NFR BLD: 0.7 %
LDLC SERPL CALC-MCNC: 124 MG/DL (ref ?–100)
LYMPHOCYTES # BLD AUTO: 1.39 X10(3) UL (ref 1–4)
LYMPHOCYTES NFR BLD AUTO: 24.7 %
MCH RBC QN AUTO: 30.7 PG (ref 26–34)
MCHC RBC AUTO-ENTMCNC: 31.8 G/DL (ref 31–37)
MCV RBC AUTO: 96.7 FL (ref 80–100)
MONOCYTES # BLD AUTO: 0.66 X10(3) UL (ref 0.1–1)
MONOCYTES NFR BLD AUTO: 11.7 %
NEUTROPHILS # BLD AUTO: 3.26 X10 (3) UL (ref 1.5–7.7)
NEUTROPHILS # BLD AUTO: 3.26 X10(3) UL (ref 1.5–7.7)
NEUTROPHILS NFR BLD AUTO: 57.9 %
NONHDLC SERPL-MCNC: 140 MG/DL (ref ?–130)
OSMOLALITY SERPL CALC.SUM OF ELEC: 294 MOSM/KG (ref 275–295)
PLATELET # BLD AUTO: 344 10(3)UL (ref 150–450)
POTASSIUM SERPL-SCNC: 4.5 MMOL/L (ref 3.5–5.1)
PROT SERPL-MCNC: 7.5 G/DL (ref 5.7–8.2)
RBC # BLD AUTO: 4.2 X10(6)UL (ref 3.8–5.3)
SODIUM SERPL-SCNC: 141 MMOL/L (ref 136–145)
TRIGL SERPL-MCNC: 89 MG/DL (ref 30–149)
TSI SER-ACNC: 2.58 UIU/ML (ref 0.55–4.78)
VIT D+METAB SERPL-MCNC: 44.4 NG/ML (ref 30–100)
VLDLC SERPL CALC-MCNC: 16 MG/DL (ref 0–30)
WBC # BLD AUTO: 5.6 X10(3) UL (ref 4–11)

## 2025-05-06 PROCEDURE — 80061 LIPID PANEL: CPT

## 2025-05-06 PROCEDURE — 82306 VITAMIN D 25 HYDROXY: CPT

## 2025-05-06 PROCEDURE — 77063 BREAST TOMOSYNTHESIS BI: CPT | Performed by: SURGERY

## 2025-05-06 PROCEDURE — 85025 COMPLETE CBC W/AUTO DIFF WBC: CPT

## 2025-05-06 PROCEDURE — 93880 EXTRACRANIAL BILAT STUDY: CPT | Performed by: INTERNAL MEDICINE

## 2025-05-06 PROCEDURE — 77067 SCR MAMMO BI INCL CAD: CPT | Performed by: SURGERY

## 2025-05-06 PROCEDURE — 80053 COMPREHEN METABOLIC PANEL: CPT

## 2025-05-06 PROCEDURE — 83036 HEMOGLOBIN GLYCOSYLATED A1C: CPT | Performed by: INTERNAL MEDICINE

## 2025-05-06 PROCEDURE — 84443 ASSAY THYROID STIM HORMONE: CPT | Performed by: INTERNAL MEDICINE

## 2025-05-06 PROCEDURE — 36415 COLL VENOUS BLD VENIPUNCTURE: CPT

## 2025-05-07 NOTE — TELEPHONE ENCOUNTER
Hgb A1C resulted.    PT safe to DC home per MD. [place] to dress self. DC teaching done, pt verbalizes understanding. Ambulatory with steady gait to exit.

## 2025-05-07 NOTE — TELEPHONE ENCOUNTER
Please call lab and ask to add HgbA1c to labs from 5/6 (I ordered, but want to make sure they add)

## 2025-05-10 NOTE — TELEPHONE ENCOUNTER
Called pt and LM on  re labs.  Asked her to call back next week to discuss.    A1c 5.8 -- new prediabetes.    Also LDL increased from 98 (done thru work in 2024) to 124.  Would recommend changing from zocor 40 to Lipitor 40    Will discuss with pt next week

## 2025-07-30 ENCOUNTER — TELEPHONE (OUTPATIENT)
Facility: LOCATION | Age: 55
End: 2025-07-30

## 2025-08-04 ENCOUNTER — E-ADVICE (OUTPATIENT)
Age: 55
End: 2025-08-04

## 2025-08-04 PROBLEM — Z15.09 BRCA2 GENE MUTATION POSITIVE: Status: ACTIVE | Noted: 2024-06-19

## 2025-08-04 PROBLEM — Z80.3 FAMILY HISTORY OF MALIGNANT NEOPLASM OF BREAST: Status: ACTIVE | Noted: 2024-06-13

## 2025-08-04 PROBLEM — Z84.81 FAMILY HISTORY OF BRCA GENE POSITIVE: Status: ACTIVE | Noted: 2024-06-13

## 2025-08-04 PROBLEM — Z80.41 FAMILY HISTORY OF MALIGNANT NEOPLASM OF OVARY: Status: ACTIVE | Noted: 2024-06-13

## 2025-08-04 PROBLEM — Z15.01 BRCA2 GENE MUTATION POSITIVE: Status: ACTIVE | Noted: 2024-06-19

## 2025-08-04 RX ORDER — ATORVASTATIN CALCIUM 40 MG/1
40 TABLET, FILM COATED ORAL NIGHTLY
COMMUNITY
Start: 2025-05-14

## 2025-08-04 RX ORDER — VENLAFAXINE HYDROCHLORIDE 75 MG/1
75 CAPSULE, EXTENDED RELEASE ORAL DAILY
COMMUNITY
Start: 2025-03-27

## 2025-08-04 RX ORDER — LOSARTAN POTASSIUM 25 MG/1
25 TABLET ORAL DAILY
COMMUNITY
Start: 2025-03-27

## 2025-08-06 ENCOUNTER — LAB SERVICES (OUTPATIENT)
Dept: LAB | Age: 55
End: 2025-08-06

## 2025-08-06 ENCOUNTER — ANCILLARY PROCEDURE (OUTPATIENT)
Dept: LAB | Age: 55
End: 2025-08-06

## 2025-08-06 DIAGNOSIS — Z01.818 PRE-OP EXAM: ICD-10-CM

## 2025-08-06 DIAGNOSIS — Z01.818 PREOP TESTING: ICD-10-CM

## 2025-08-06 DIAGNOSIS — Z01.818 PRE-OP EXAM: Primary | ICD-10-CM

## 2025-08-06 LAB
ALBUMIN SERPL-MCNC: 3.9 G/DL (ref 3.4–5)
ALBUMIN/GLOB SERPL: 1 {RATIO} (ref 1–2.4)
ALP SERPL-CCNC: 63 UNITS/L (ref 45–117)
ALT SERPL-CCNC: 21 UNITS/L
ANION GAP SERPL CALC-SCNC: 6 MMOL/L (ref 7–19)
AST SERPL-CCNC: 25 UNITS/L
ATRIAL RATE (BPM): 54
BASOPHILS # BLD: 0 K/MCL (ref 0–0.3)
BASOPHILS NFR BLD: 1 %
BILIRUB SERPL-MCNC: 0.6 MG/DL (ref 0.2–1)
BUN SERPL-MCNC: 16 MG/DL (ref 6–20)
BUN/CREAT SERPL: 21 (ref 7–25)
CALCIUM SERPL-MCNC: 10 MG/DL (ref 8.4–10.2)
CHLORIDE SERPL-SCNC: 103 MMOL/L (ref 97–110)
CO2 SERPL-SCNC: 32 MMOL/L (ref 21–32)
CREAT SERPL-MCNC: 0.76 MG/DL (ref 0.51–0.95)
DEPRECATED RDW RBC: 45.9 FL (ref 39–50)
EGFRCR SERPLBLD CKD-EPI 2021: >90 ML/MIN/{1.73_M2}
EOSINOPHIL # BLD: 0.5 K/MCL (ref 0–0.5)
EOSINOPHIL NFR BLD: 7 %
ERYTHROCYTE [DISTWIDTH] IN BLOOD: 13.3 % (ref 11–15)
FASTING DURATION TIME PATIENT: 12 HOURS (ref 0–999)
GLOBULIN SER-MCNC: 3.9 G/DL (ref 2–4)
GLUCOSE SERPL-MCNC: 101 MG/DL (ref 70–99)
HCT VFR BLD CALC: 39.6 % (ref 36–46.5)
HGB BLD-MCNC: 13 G/DL (ref 12–15.5)
IMM GRANULOCYTES # BLD AUTO: 0 K/MCL (ref 0–0.2)
IMM GRANULOCYTES # BLD: 1 %
LYMPHOCYTES # BLD: 1.3 K/MCL (ref 1–4)
LYMPHOCYTES NFR BLD: 22 %
MCH RBC QN AUTO: 30.9 PG (ref 26–34)
MCHC RBC AUTO-ENTMCNC: 32.8 G/DL (ref 32–36.5)
MCV RBC AUTO: 94.1 FL (ref 78–100)
MONOCYTES # BLD: 0.7 K/MCL (ref 0.3–0.9)
MONOCYTES NFR BLD: 12 %
NEUTROPHILS # BLD: 3.6 K/MCL (ref 1.8–7.7)
NEUTROPHILS NFR BLD: 57 %
NRBC BLD MANUAL-RTO: 0 /100 WBC
P AXIS (DEGREES): 46
PLATELET # BLD AUTO: 300 K/MCL (ref 140–450)
POTASSIUM SERPL-SCNC: 4.3 MMOL/L (ref 3.4–5.1)
PR-INTERVAL (MSEC): 176
PROT SERPL-MCNC: 7.8 G/DL (ref 6.4–8.2)
QRS-INTERVAL (MSEC): 80
QT-INTERVAL (MSEC): 382
QTC: 362
R AXIS (DEGREES): 18
RBC # BLD: 4.21 MIL/MCL (ref 4–5.2)
REPORT TEXT: NORMAL
SODIUM SERPL-SCNC: 137 MMOL/L (ref 135–145)
T AXIS (DEGREES): 39
VENTRICULAR RATE EKG/MIN (BPM): 54
WBC # BLD: 6.2 K/MCL (ref 4.2–11)

## 2025-08-06 PROCEDURE — 80053 COMPREHEN METABOLIC PANEL: CPT | Performed by: INTERNAL MEDICINE

## 2025-08-06 PROCEDURE — 93005 ELECTROCARDIOGRAM TRACING: CPT

## 2025-08-06 PROCEDURE — 85025 COMPLETE CBC W/AUTO DIFF WBC: CPT | Performed by: INTERNAL MEDICINE

## 2025-08-06 PROCEDURE — 93010 ELECTROCARDIOGRAM REPORT: CPT | Performed by: INTERNAL MEDICINE

## 2025-08-06 PROCEDURE — 36415 COLL VENOUS BLD VENIPUNCTURE: CPT | Performed by: INTERNAL MEDICINE

## 2025-08-07 SDOH — ECONOMIC STABILITY: FOOD INSECURITY: WITHIN THE PAST 12 MONTHS, THE FOOD YOU BOUGHT JUST DIDN'T LAST AND YOU DIDN'T HAVE MONEY TO GET MORE.: NEVER TRUE

## 2025-08-07 SDOH — ECONOMIC STABILITY: HOUSING INSECURITY: DO YOU HAVE PROBLEMS WITH ANY OF THE FOLLOWING?: NONE OF THE ABOVE

## 2025-08-07 SDOH — ECONOMIC STABILITY: TRANSPORTATION INSECURITY
IN THE PAST 12 MONTHS, HAS LACK OF RELIABLE TRANSPORTATION KEPT YOU FROM MEDICAL APPOINTMENTS, MEETINGS, WORK OR FROM GETTING THINGS NEEDED FOR DAILY LIVING?: NO

## 2025-08-07 SDOH — ECONOMIC STABILITY: HOUSING INSECURITY: WHAT IS YOUR LIVING SITUATION TODAY?: I HAVE A STEADY PLACE TO LIVE

## 2025-08-07 ASSESSMENT — SOCIAL DETERMINANTS OF HEALTH (SDOH): IN THE PAST 12 MONTHS, HAS THE ELECTRIC, GAS, OIL, OR WATER COMPANY THREATENED TO SHUT OFF SERVICE IN YOUR HOME?: NO

## 2025-08-14 ENCOUNTER — APPOINTMENT (OUTPATIENT)
Age: 55
End: 2025-08-14

## 2025-08-14 VITALS
OXYGEN SATURATION: 98 % | WEIGHT: 157.63 LBS | TEMPERATURE: 98.2 F | SYSTOLIC BLOOD PRESSURE: 120 MMHG | DIASTOLIC BLOOD PRESSURE: 84 MMHG | HEART RATE: 75 BPM

## 2025-08-14 DIAGNOSIS — Z01.818 PREOP TESTING: Primary | ICD-10-CM

## 2025-08-14 SDOH — HEALTH STABILITY: MENTAL HEALTH: HOW OFTEN DO YOU HAVE A DRINK CONTAINING ALCOHOL?: MONTHLY OR LESS

## 2025-08-14 SDOH — HEALTH STABILITY: MENTAL HEALTH: HOW OFTEN DO YOU HAVE 6 OR MORE DRINKS ON ONE OCCASION?: LESS THAN MONTHLY

## 2025-08-14 SDOH — HEALTH STABILITY: MENTAL HEALTH: AUDIT TOTAL SCORE: 2

## 2025-08-14 SDOH — HEALTH STABILITY: MENTAL HEALTH: HOW MANY STANDARD DRINKS CONTAINING ALCOHOL DO YOU HAVE ON A TYPICAL DAY?: 0,1 OR 2

## 2025-08-14 ASSESSMENT — PATIENT HEALTH QUESTIONNAIRE - PHQ9
CLINICAL INTERPRETATION OF PHQ2 SCORE: NO FURTHER SCREENING NEEDED
1. LITTLE INTEREST OR PLEASURE IN DOING THINGS: NOT AT ALL
SUM OF ALL RESPONSES TO PHQ9 QUESTIONS 1 AND 2: 0
2. FEELING DOWN, DEPRESSED OR HOPELESS: NOT AT ALL
SUM OF ALL RESPONSES TO PHQ9 QUESTIONS 1 AND 2: 0

## 2025-08-15 ENCOUNTER — TELEPHONE (OUTPATIENT)
Age: 55
End: 2025-08-15

## 2025-08-15 ENCOUNTER — OFFICE VISIT (OUTPATIENT)
Facility: LOCATION | Age: 55
End: 2025-08-15
Attending: SOCIAL WORKER

## 2025-08-15 DIAGNOSIS — Z15.01 BRCA2 GENE MUTATION POSITIVE: Primary | ICD-10-CM

## 2025-08-15 DIAGNOSIS — Z15.09 BRCA2 GENE MUTATION POSITIVE: Primary | ICD-10-CM

## 2025-08-15 DIAGNOSIS — Z71.9 ENCOUNTER FOR HEALTH EDUCATION: ICD-10-CM

## 2025-08-17 ENCOUNTER — HOSPITAL ENCOUNTER (EMERGENCY)
Facility: HOSPITAL | Age: 55
Discharge: HOME | End: 2025-08-17
Attending: STUDENT IN AN ORGANIZED HEALTH CARE EDUCATION/TRAINING PROGRAM

## 2025-08-17 ENCOUNTER — HOSPITAL ENCOUNTER (OUTPATIENT)
Age: 55
Discharge: ANOTHER HOSPITAL | End: 2025-08-17

## 2025-08-17 ENCOUNTER — HOSPITAL ENCOUNTER (OUTPATIENT)
Age: 55
Discharge: HOME | End: 2025-08-17

## 2025-08-17 VITALS
HEIGHT: 63 IN | DIASTOLIC BLOOD PRESSURE: 85 MMHG | OXYGEN SATURATION: 98 % | HEART RATE: 79 BPM | SYSTOLIC BLOOD PRESSURE: 125 MMHG | RESPIRATION RATE: 16 BRPM | WEIGHT: 155 LBS | BODY MASS INDEX: 27.46 KG/M2 | TEMPERATURE: 98 F

## 2025-08-17 VITALS
TEMPERATURE: 98 F | RESPIRATION RATE: 16 BRPM | OXYGEN SATURATION: 99 % | DIASTOLIC BLOOD PRESSURE: 88 MMHG | SYSTOLIC BLOOD PRESSURE: 125 MMHG | HEART RATE: 91 BPM

## 2025-08-17 DIAGNOSIS — R10.813: ICD-10-CM

## 2025-08-17 DIAGNOSIS — R19.7 DIARRHEA, UNSPECIFIED TYPE: Primary | ICD-10-CM

## 2025-08-17 DIAGNOSIS — R19.7 DIARRHEA OF PRESUMED INFECTIOUS ORIGIN: Primary | ICD-10-CM

## 2025-08-17 DIAGNOSIS — R19.7 DIARRHEA, UNSPECIFIED TYPE: ICD-10-CM

## 2025-08-17 LAB
BUN BLD-MCNC: 7 MG/DL (ref 7–18)
CHLORIDE BLD-SCNC: 99 MMOL/L (ref 98–112)
CLARITY UR: CLEAR
CO2 BLD-SCNC: 26 MMOL/L (ref 21–32)
COLOR UR: YELLOW
CREAT BLD-MCNC: 0.7 MG/DL (ref 0.55–1.02)
EGFRCR SERPLBLD CKD-EPI 2021: 102 ML/MIN/1.73M2 (ref 60–?)
GLUCOSE BLD-MCNC: 124 MG/DL (ref 70–99)
GLUCOSE UR STRIP-MCNC: NEGATIVE MG/DL
HCT VFR BLD AUTO: 43 % (ref 35–48)
HCT VFR BLD CALC: 43 % (ref 34–50)
HGB BLD-MCNC: 13.6 G/DL (ref 12–16)
HGB UR QL STRIP: NEGATIVE
ISTAT IONIZED CALCIUM FOR CHEM 8: 1.21 MMOL/L (ref 1.12–1.32)
LEUKOCYTE ESTERASE UR QL STRIP: NEGATIVE
MCH RBC QN AUTO: 30 PG (ref 26–34)
MCHC RBC AUTO-ENTMCNC: 31.6 G/DL (ref 31–37)
MCV RBC AUTO: 94.7 FL (ref 80–100)
NITRITE UR QL STRIP: NEGATIVE
PH UR STRIP: 5.5
PLATELET # BLD AUTO: 296 X10ˆ3/UL (ref 150–450)
POTASSIUM BLD-SCNC: 4.4 MMOL/L (ref 3.6–5.1)
RBC # BLD AUTO: 4.54 X10ˆ6/UL (ref 3.8–5.3)
SODIUM BLD-SCNC: 139 MMOL/L (ref 136–145)
SP GR UR STRIP: 1.02
UROBILINOGEN UR STRIP-ACNC: <2 MG/DL
WBC # BLD AUTO: 12.2 X10ˆ3/UL (ref 4–11)

## 2025-08-17 PROCEDURE — 99284 EMERGENCY DEPT VISIT MOD MDM: CPT

## 2025-08-17 PROCEDURE — 85025 COMPLETE CBC W/AUTO DIFF WBC: CPT | Performed by: EMERGENCY MEDICINE

## 2025-08-17 PROCEDURE — 81002 URINALYSIS NONAUTO W/O SCOPE: CPT

## 2025-08-17 PROCEDURE — 85060 BLOOD SMEAR INTERPRETATION: CPT | Performed by: EMERGENCY MEDICINE

## 2025-08-17 PROCEDURE — 99213 OFFICE O/P EST LOW 20 MIN: CPT

## 2025-08-17 PROCEDURE — 99283 EMERGENCY DEPT VISIT LOW MDM: CPT

## 2025-08-17 PROCEDURE — 80047 BASIC METABLC PNL IONIZED CA: CPT

## 2025-08-17 PROCEDURE — 36415 COLL VENOUS BLD VENIPUNCTURE: CPT

## 2025-08-17 RX ORDER — DIPHENHYDRAMINE HYDROCHLORIDE 50 MG/ML
25 INJECTION, SOLUTION INTRAMUSCULAR; INTRAVENOUS ONCE
Status: DISCONTINUED | OUTPATIENT
Start: 2025-08-17 | End: 2025-08-17

## 2025-08-17 RX ORDER — METHYLPREDNISOLONE SODIUM SUCCINATE 125 MG/2ML
125 INJECTION INTRAMUSCULAR; INTRAVENOUS ONCE
Status: DISCONTINUED | OUTPATIENT
Start: 2025-08-17 | End: 2025-08-17

## 2025-08-17 RX ORDER — DICYCLOMINE HCL 20 MG
20 TABLET ORAL 4 TIMES DAILY PRN
Qty: 30 TABLET | Refills: 0 | Status: SHIPPED | OUTPATIENT
Start: 2025-08-17 | End: 2025-08-20 | Stop reason: ALTCHOICE

## 2025-08-18 ENCOUNTER — LAB ENCOUNTER (OUTPATIENT)
Dept: LAB | Facility: HOSPITAL | Age: 55
End: 2025-08-18
Attending: STUDENT IN AN ORGANIZED HEALTH CARE EDUCATION/TRAINING PROGRAM

## 2025-08-18 LAB
ADENOVIRUS F 40/41 PCR: NEGATIVE
ASTROVIRUS PCR: NEGATIVE
BASOPHILS # BLD AUTO: 0.08 X10(3) UL (ref 0–0.2)
BASOPHILS NFR BLD AUTO: 0.6 %
C CAYETANENSIS DNA SPEC QL NAA+PROBE: NEGATIVE
C DIFF TOX B STL QL: NEGATIVE
CAMPY SP DNA.DIARRHEA STL QL NAA+PROBE: NEGATIVE
CRYPTOSP DNA SPEC QL NAA+PROBE: NEGATIVE
DEPRECATED RDW RBC AUTO: 45.8 FL (ref 35.1–46.3)
EAEC PAA PLAS AGGR+AATA ST NAA+NON-PRB: NEGATIVE
EC STX1+STX2 + H7 FLIC SPEC NAA+PROBE: NEGATIVE
ENTAMOEBA HISTOLYTICA PCR: NEGATIVE
EOSINOPHIL # BLD AUTO: 2.73 X10(3) UL (ref 0–0.7)
EOSINOPHIL NFR BLD AUTO: 21 %
EPEC EAE GENE STL QL NAA+NON-PROBE: NEGATIVE
ERYTHROCYTE [DISTWIDTH] IN BLOOD BY AUTOMATED COUNT: 13.2 % (ref 11–15)
ETEC LTA+ST1A+ST1B TOX ST NAA+NON-PROBE: NEGATIVE
GIARDIA LAMBLIA PCR: NEGATIVE
HCT VFR BLD AUTO: 42.3 % (ref 35–48)
HGB BLD-MCNC: 14 G/DL (ref 12–16)
IMM GRANULOCYTES # BLD AUTO: 0.37 X10(3) UL (ref 0–1)
IMM GRANULOCYTES NFR BLD: 2.9 %
LYMPHOCYTES # BLD AUTO: 1.78 X10(3) UL (ref 1–4)
LYMPHOCYTES NFR BLD AUTO: 13.7 %
MCH RBC QN AUTO: 31.2 PG (ref 26–34)
MCHC RBC AUTO-ENTMCNC: 33.1 G/DL (ref 31–37)
MCV RBC AUTO: 94.2 FL (ref 80–100)
MONOCYTES # BLD AUTO: 0.97 X10(3) UL (ref 0.1–1)
MONOCYTES NFR BLD AUTO: 7.5 %
NEUTROPHILS # BLD AUTO: 7.04 X10 (3) UL (ref 1.5–7.7)
NEUTROPHILS # BLD AUTO: 7.04 X10(3) UL (ref 1.5–7.7)
NEUTROPHILS NFR BLD AUTO: 54.3 %
NOROVIRUS GI/GII PCR: NEGATIVE
P SHIGELLOIDES DNA STL QL NAA+PROBE: NEGATIVE
PLATELET # BLD AUTO: 325 10(3)UL (ref 150–450)
RBC # BLD AUTO: 4.49 X10(6)UL (ref 3.8–5.3)
ROTAVIRUS A PCR: NEGATIVE
SALMONELLA DNA SPEC QL NAA+PROBE: NEGATIVE
SAPOVIRUS PCR: NEGATIVE
SHIGELLA SP+EIEC IPAH ST NAA+NON-PROBE: NEGATIVE
V CHOLERAE DNA SPEC QL NAA+PROBE: NEGATIVE
VIBRIO DNA SPEC NAA+PROBE: NEGATIVE
WBC # BLD AUTO: 13 X10(3) UL (ref 4–11)
YERSINIA DNA SPEC NAA+PROBE: NEGATIVE

## 2025-08-18 PROCEDURE — 87507 IADNA-DNA/RNA PROBE TQ 12-25: CPT | Performed by: STUDENT IN AN ORGANIZED HEALTH CARE EDUCATION/TRAINING PROGRAM

## 2025-08-18 PROCEDURE — 87493 C DIFF AMPLIFIED PROBE: CPT | Performed by: STUDENT IN AN ORGANIZED HEALTH CARE EDUCATION/TRAINING PROGRAM

## 2025-08-19 ENCOUNTER — TELEPHONE (OUTPATIENT)
Facility: CLINIC | Age: 55
End: 2025-08-19

## 2025-08-19 ENCOUNTER — LAB ENCOUNTER (OUTPATIENT)
Dept: LAB | Facility: HOSPITAL | Age: 55
End: 2025-08-19
Attending: NURSE PRACTITIONER

## 2025-08-19 ENCOUNTER — OFFICE VISIT (OUTPATIENT)
Facility: CLINIC | Age: 55
End: 2025-08-19

## 2025-08-19 VITALS
DIASTOLIC BLOOD PRESSURE: 81 MMHG | WEIGHT: 155 LBS | BODY MASS INDEX: 27.46 KG/M2 | HEART RATE: 81 BPM | SYSTOLIC BLOOD PRESSURE: 120 MMHG | HEIGHT: 63 IN

## 2025-08-19 DIAGNOSIS — R19.7 DIARRHEA, UNSPECIFIED TYPE: Primary | ICD-10-CM

## 2025-08-19 DIAGNOSIS — R19.7 DIARRHEA, UNSPECIFIED TYPE: ICD-10-CM

## 2025-08-19 DIAGNOSIS — Z12.11 COLON CANCER SCREENING: ICD-10-CM

## 2025-08-19 LAB
ANION GAP SERPL CALC-SCNC: 6 MMOL/L (ref 0–18)
BASOPHILS # BLD AUTO: 0.07 X10(3) UL (ref 0–0.2)
BASOPHILS NFR BLD AUTO: 0.5 %
BUN BLD-MCNC: 8 MG/DL (ref 9–23)
BUN/CREAT SERPL: 9.6 (ref 10–20)
CALCIUM BLD-MCNC: 10.4 MG/DL (ref 8.7–10.4)
CHLORIDE SERPL-SCNC: 99 MMOL/L (ref 98–112)
CO2 SERPL-SCNC: 32 MMOL/L (ref 21–32)
CREAT BLD-MCNC: 0.83 MG/DL (ref 0.55–1.02)
DEPRECATED RDW RBC AUTO: 45.7 FL (ref 35.1–46.3)
EGFRCR SERPLBLD CKD-EPI 2021: 83 ML/MIN/1.73M2 (ref 60–?)
EOSINOPHIL # BLD AUTO: 4.02 X10(3) UL (ref 0–0.7)
EOSINOPHIL NFR BLD AUTO: 29 %
ERYTHROCYTE [DISTWIDTH] IN BLOOD BY AUTOMATED COUNT: 13.4 % (ref 11–15)
FASTING STATUS PATIENT QL REPORTED: NO
GLUCOSE BLD-MCNC: 95 MG/DL (ref 70–99)
HCT VFR BLD AUTO: 41.6 % (ref 35–48)
HGB BLD-MCNC: 14.3 G/DL (ref 12–16)
IMM GRANULOCYTES # BLD AUTO: 0.34 X10(3) UL (ref 0–1)
IMM GRANULOCYTES NFR BLD: 2.5 %
LYMPHOCYTES # BLD AUTO: 1.1 X10(3) UL (ref 1–4)
LYMPHOCYTES NFR BLD AUTO: 7.9 %
MCH RBC QN AUTO: 32.1 PG (ref 26–34)
MCHC RBC AUTO-ENTMCNC: 34.4 G/DL (ref 31–37)
MCV RBC AUTO: 93.5 FL (ref 80–100)
MONOCYTES # BLD AUTO: 1 X10(3) UL (ref 0.1–1)
MONOCYTES NFR BLD AUTO: 7.2 %
NEUTROPHILS # BLD AUTO: 7.32 X10 (3) UL (ref 1.5–7.7)
NEUTROPHILS # BLD AUTO: 7.32 X10(3) UL (ref 1.5–7.7)
NEUTROPHILS NFR BLD AUTO: 52.9 %
OSMOLALITY SERPL CALC.SUM OF ELEC: 282 MOSM/KG (ref 275–295)
PLATELET # BLD AUTO: 294 10(3)UL (ref 150–450)
POTASSIUM SERPL-SCNC: 4.2 MMOL/L (ref 3.5–5.1)
RBC # BLD AUTO: 4.45 X10(6)UL (ref 3.8–5.3)
SODIUM SERPL-SCNC: 137 MMOL/L (ref 136–145)
WBC # BLD AUTO: 13.9 X10(3) UL (ref 4–11)

## 2025-08-19 PROCEDURE — 99204 OFFICE O/P NEW MOD 45 MIN: CPT | Performed by: NURSE PRACTITIONER

## 2025-08-19 PROCEDURE — 3008F BODY MASS INDEX DOCD: CPT | Performed by: NURSE PRACTITIONER

## 2025-08-19 PROCEDURE — 36415 COLL VENOUS BLD VENIPUNCTURE: CPT

## 2025-08-19 PROCEDURE — 3079F DIAST BP 80-89 MM HG: CPT | Performed by: NURSE PRACTITIONER

## 2025-08-19 PROCEDURE — 3074F SYST BP LT 130 MM HG: CPT | Performed by: NURSE PRACTITIONER

## 2025-08-19 PROCEDURE — 80048 BASIC METABOLIC PNL TOTAL CA: CPT

## 2025-08-19 PROCEDURE — 85025 COMPLETE CBC W/AUTO DIFF WBC: CPT

## 2025-08-20 ENCOUNTER — ANESTHESIA EVENT (OUTPATIENT)
Dept: ENDOSCOPY | Facility: HOSPITAL | Age: 55
End: 2025-08-20

## 2025-08-21 ENCOUNTER — ANESTHESIA (OUTPATIENT)
Dept: ENDOSCOPY | Facility: HOSPITAL | Age: 55
End: 2025-08-21

## 2025-08-21 ENCOUNTER — HOSPITAL ENCOUNTER (OUTPATIENT)
Facility: HOSPITAL | Age: 55
Setting detail: HOSPITAL OUTPATIENT SURGERY
Discharge: HOME | End: 2025-08-21
Attending: INTERNAL MEDICINE | Admitting: INTERNAL MEDICINE

## 2025-08-21 VITALS
RESPIRATION RATE: 15 BRPM | DIASTOLIC BLOOD PRESSURE: 84 MMHG | BODY MASS INDEX: 27.11 KG/M2 | SYSTOLIC BLOOD PRESSURE: 119 MMHG | HEART RATE: 74 BPM | WEIGHT: 153 LBS | HEIGHT: 63 IN | OXYGEN SATURATION: 97 %

## 2025-08-21 DIAGNOSIS — Z12.11 COLON CANCER SCREENING: ICD-10-CM

## 2025-08-21 DIAGNOSIS — R19.7 DIARRHEA, UNSPECIFIED TYPE: ICD-10-CM

## 2025-08-21 PROCEDURE — 45380 COLONOSCOPY AND BIOPSY: CPT | Performed by: INTERNAL MEDICINE

## 2025-08-21 RX ORDER — NICOTINE POLACRILEX 4 MG
15 LOZENGE BUCCAL
Status: DISCONTINUED | OUTPATIENT
Start: 2025-08-21 | End: 2025-08-21

## 2025-08-21 RX ORDER — NALOXONE HYDROCHLORIDE 0.4 MG/ML
0.08 INJECTION, SOLUTION INTRAMUSCULAR; INTRAVENOUS; SUBCUTANEOUS ONCE AS NEEDED
Status: DISCONTINUED | OUTPATIENT
Start: 2025-08-21 | End: 2025-08-21

## 2025-08-21 RX ORDER — SODIUM CHLORIDE, SODIUM LACTATE, POTASSIUM CHLORIDE, CALCIUM CHLORIDE 600; 310; 30; 20 MG/100ML; MG/100ML; MG/100ML; MG/100ML
INJECTION, SOLUTION INTRAVENOUS CONTINUOUS
Status: DISCONTINUED | OUTPATIENT
Start: 2025-08-21 | End: 2025-08-21

## 2025-08-21 RX ORDER — DEXTROSE MONOHYDRATE 25 G/50ML
50 INJECTION, SOLUTION INTRAVENOUS
Status: DISCONTINUED | OUTPATIENT
Start: 2025-08-21 | End: 2025-08-21

## 2025-08-21 RX ORDER — NICOTINE POLACRILEX 4 MG
30 LOZENGE BUCCAL
Status: DISCONTINUED | OUTPATIENT
Start: 2025-08-21 | End: 2025-08-21

## 2025-08-21 RX ADMIN — SODIUM CHLORIDE, SODIUM LACTATE, POTASSIUM CHLORIDE, CALCIUM CHLORIDE: 600; 310; 30; 20 INJECTION, SOLUTION INTRAVENOUS at 13:56:00

## 2025-08-22 ENCOUNTER — RESULTS FOLLOW-UP (OUTPATIENT)
Dept: ENDOSCOPY | Facility: HOSPITAL | Age: 55
End: 2025-08-22

## 2025-08-22 ENCOUNTER — TELEPHONE (OUTPATIENT)
Facility: CLINIC | Age: 55
End: 2025-08-22

## 2025-08-23 ENCOUNTER — APPOINTMENT (OUTPATIENT)
Dept: CT IMAGING | Facility: HOSPITAL | Age: 55
End: 2025-08-23
Attending: EMERGENCY MEDICINE

## 2025-08-23 ENCOUNTER — HOSPITAL ENCOUNTER (INPATIENT)
Facility: HOSPITAL | Age: 55
LOS: 3 days | Discharge: HOME | End: 2025-08-26
Attending: EMERGENCY MEDICINE | Admitting: HOSPITALIST

## 2025-08-23 DIAGNOSIS — D72.829 LEUKOCYTOSIS, UNSPECIFIED TYPE: ICD-10-CM

## 2025-08-23 DIAGNOSIS — R74.01 TRANSAMINITIS: ICD-10-CM

## 2025-08-23 DIAGNOSIS — R19.7 DIARRHEA, UNSPECIFIED TYPE: Primary | ICD-10-CM

## 2025-08-23 LAB
ALBUMIN SERPL-MCNC: 4.4 G/DL (ref 3.2–4.8)
ALP LIVER SERPL-CCNC: 250 U/L (ref 41–108)
ALT SERPL-CCNC: 211 U/L (ref 10–49)
ANION GAP SERPL CALC-SCNC: 6 MMOL/L (ref 0–18)
AST SERPL-CCNC: 241 U/L (ref ?–34)
BASOPHILS # BLD: 0.14 X10(3) UL (ref 0–0.2)
BASOPHILS NFR BLD: 1 %
BILIRUB DIRECT SERPL-MCNC: 0.8 MG/DL (ref ?–0.3)
BILIRUB SERPL-MCNC: 1.5 MG/DL (ref 0.3–1.2)
BUN BLD-MCNC: 9 MG/DL (ref 9–23)
BUN/CREAT SERPL: 11.1 (ref 10–20)
CALCIUM BLD-MCNC: 9.1 MG/DL (ref 8.7–10.4)
CHLORIDE SERPL-SCNC: 100 MMOL/L (ref 98–112)
CO2 SERPL-SCNC: 31 MMOL/L (ref 21–32)
CREAT BLD-MCNC: 0.81 MG/DL (ref 0.55–1.02)
DEPRECATED RDW RBC AUTO: 44.1 FL (ref 35.1–46.3)
EGFRCR SERPLBLD CKD-EPI 2021: 86 ML/MIN/1.73M2 (ref 60–?)
EOSINOPHIL # BLD: 6.19 X10(3) UL (ref 0–0.7)
EOSINOPHIL NFR BLD: 43 %
ERYTHROCYTE [DISTWIDTH] IN BLOOD BY AUTOMATED COUNT: 13.2 % (ref 11–15)
GLUCOSE BLD-MCNC: 168 MG/DL (ref 70–99)
HAV IGM SER QL: NONREACTIVE
HBV CORE IGM SER QL: NONREACTIVE
HBV SURFACE AG SERPL QL IA: NONREACTIVE
HCT VFR BLD AUTO: 37.7 % (ref 35–48)
HCV AB SERPL QL IA: NONREACTIVE
HGB BLD-MCNC: 12.8 G/DL (ref 12–16)
IGA SERPL-MCNC: 343.5 MG/DL (ref 70–312)
LYMPHOCYTES NFR BLD: 1.58 X10(3) UL (ref 1–4)
LYMPHOCYTES NFR BLD: 11 %
MAGNESIUM SERPL-MCNC: 2 MG/DL (ref 1.6–2.6)
MCH RBC QN AUTO: 31 PG (ref 26–34)
MCHC RBC AUTO-ENTMCNC: 34 G/DL (ref 31–37)
MCV RBC AUTO: 91.3 FL (ref 80–100)
MONOCYTES # BLD: 1.01 X10(3) UL (ref 0.1–1)
MONOCYTES NFR BLD: 7 %
MORPHOLOGY: NORMAL
NEUTROPHILS # BLD AUTO: 3.77 X10 (3) UL (ref 1.5–7.7)
NEUTROPHILS NFR BLD: 38 %
NEUTS HYPERSEG # BLD: 5.47 X10(3) UL (ref 1.5–7.7)
OSMOLALITY SERPL CALC.SUM OF ELEC: 287 MOSM/KG (ref 275–295)
PLATELET # BLD AUTO: 225 10(3)UL (ref 150–450)
PLATELET MORPHOLOGY: NORMAL
POTASSIUM SERPL-SCNC: 3.8 MMOL/L (ref 3.5–5.1)
PROT SERPL-MCNC: 7.2 G/DL (ref 5.7–8.2)
RBC # BLD AUTO: 4.13 X10(6)UL (ref 3.8–5.3)
SODIUM SERPL-SCNC: 137 MMOL/L (ref 136–145)
TOTAL CELLS COUNTED BLD: 100
WBC # BLD AUTO: 14.4 X10(3) UL (ref 4–11)

## 2025-08-23 PROCEDURE — 99223 1ST HOSP IP/OBS HIGH 75: CPT | Performed by: HOSPITALIST

## 2025-08-23 PROCEDURE — 74176 CT ABD & PELVIS W/O CONTRAST: CPT | Performed by: EMERGENCY MEDICINE

## 2025-08-23 RX ORDER — KETOROLAC TROMETHAMINE 30 MG/ML
30 INJECTION, SOLUTION INTRAMUSCULAR; INTRAVENOUS ONCE
Status: COMPLETED | OUTPATIENT
Start: 2025-08-23 | End: 2025-08-23

## 2025-08-23 RX ORDER — HEPARIN SODIUM 5000 [USP'U]/ML
5000 INJECTION, SOLUTION INTRAVENOUS; SUBCUTANEOUS EVERY 8 HOURS SCHEDULED
Status: DISCONTINUED | OUTPATIENT
Start: 2025-08-23 | End: 2025-08-26

## 2025-08-23 RX ORDER — BISACODYL 10 MG
10 SUPPOSITORY, RECTAL RECTAL
Status: DISCONTINUED | OUTPATIENT
Start: 2025-08-23 | End: 2025-08-26

## 2025-08-23 RX ORDER — LOSARTAN POTASSIUM 25 MG/1
25 TABLET ORAL NIGHTLY
Status: DISCONTINUED | OUTPATIENT
Start: 2025-08-23 | End: 2025-08-26

## 2025-08-23 RX ORDER — SODIUM CHLORIDE 9 MG/ML
INJECTION, SOLUTION INTRAVENOUS CONTINUOUS
Status: DISCONTINUED | OUTPATIENT
Start: 2025-08-23 | End: 2025-08-26

## 2025-08-23 RX ORDER — POLYETHYLENE GLYCOL 3350 17 G/17G
17 POWDER, FOR SOLUTION ORAL DAILY PRN
Status: DISCONTINUED | OUTPATIENT
Start: 2025-08-23 | End: 2025-08-26

## 2025-08-23 RX ORDER — HYDROCODONE BITARTRATE AND ACETAMINOPHEN 5; 325 MG/1; MG/1
1 TABLET ORAL EVERY 4 HOURS PRN
Status: DISCONTINUED | OUTPATIENT
Start: 2025-08-23 | End: 2025-08-26

## 2025-08-23 RX ORDER — SENNOSIDES 8.6 MG
17.2 TABLET ORAL NIGHTLY PRN
Status: DISCONTINUED | OUTPATIENT
Start: 2025-08-23 | End: 2025-08-26

## 2025-08-23 RX ORDER — HYDROCODONE BITARTRATE AND ACETAMINOPHEN 5; 325 MG/1; MG/1
2 TABLET ORAL EVERY 4 HOURS PRN
Status: DISCONTINUED | OUTPATIENT
Start: 2025-08-23 | End: 2025-08-26

## 2025-08-23 RX ORDER — ATORVASTATIN CALCIUM 40 MG/1
40 TABLET, FILM COATED ORAL NIGHTLY
Status: DISCONTINUED | OUTPATIENT
Start: 2025-08-23 | End: 2025-08-23

## 2025-08-23 RX ORDER — PROCHLORPERAZINE EDISYLATE 5 MG/ML
5 INJECTION INTRAMUSCULAR; INTRAVENOUS EVERY 8 HOURS PRN
Status: DISCONTINUED | OUTPATIENT
Start: 2025-08-23 | End: 2025-08-26

## 2025-08-23 RX ORDER — SODIUM PHOSPHATE, DIBASIC AND SODIUM PHOSPHATE, MONOBASIC 7; 19 G/230ML; G/230ML
1 ENEMA RECTAL ONCE AS NEEDED
Status: DISCONTINUED | OUTPATIENT
Start: 2025-08-23 | End: 2025-08-26

## 2025-08-23 RX ORDER — BENZONATATE 100 MG/1
200 CAPSULE ORAL 3 TIMES DAILY PRN
Status: DISCONTINUED | OUTPATIENT
Start: 2025-08-23 | End: 2025-08-26

## 2025-08-23 RX ORDER — ONDANSETRON 2 MG/ML
4 INJECTION INTRAMUSCULAR; INTRAVENOUS EVERY 6 HOURS PRN
Status: DISCONTINUED | OUTPATIENT
Start: 2025-08-23 | End: 2025-08-26

## 2025-08-23 RX ORDER — ECHINACEA PURPUREA EXTRACT 125 MG
1 TABLET ORAL
Status: DISCONTINUED | OUTPATIENT
Start: 2025-08-23 | End: 2025-08-26

## 2025-08-23 RX ORDER — DICYCLOMINE HYDROCHLORIDE 10 MG/1
10 CAPSULE ORAL
Status: DISCONTINUED | OUTPATIENT
Start: 2025-08-23 | End: 2025-08-24

## 2025-08-23 RX ORDER — ACETAMINOPHEN 325 MG/1
650 TABLET ORAL EVERY 4 HOURS PRN
Status: DISCONTINUED | OUTPATIENT
Start: 2025-08-23 | End: 2025-08-23

## 2025-08-23 RX ORDER — VENLAFAXINE HYDROCHLORIDE 75 MG/1
75 CAPSULE, EXTENDED RELEASE ORAL NIGHTLY
Status: DISCONTINUED | OUTPATIENT
Start: 2025-08-23 | End: 2025-08-26

## 2025-08-24 LAB
ADENOVIRUS F 40/41 PCR: NEGATIVE
ALBUMIN SERPL-MCNC: 4 G/DL (ref 3.2–4.8)
ALBUMIN/GLOB SERPL: 1.5 (ref 1–2)
ALP LIVER SERPL-CCNC: 344 U/L (ref 41–108)
ALT SERPL-CCNC: 196 U/L (ref 10–49)
ANION GAP SERPL CALC-SCNC: 8 MMOL/L (ref 0–18)
AST SERPL-CCNC: 232 U/L (ref ?–34)
ASTROVIRUS PCR: NEGATIVE
BASOPHILS # BLD: 0 X10(3) UL (ref 0–0.2)
BASOPHILS NFR BLD: 0 %
BILIRUB SERPL-MCNC: 1.2 MG/DL (ref 0.3–1.2)
BUN BLD-MCNC: 7 MG/DL (ref 9–23)
BUN/CREAT SERPL: 9.7 (ref 10–20)
C CAYETANENSIS DNA SPEC QL NAA+PROBE: NEGATIVE
CALCIUM BLD-MCNC: 8.6 MG/DL (ref 8.7–10.4)
CAMPY SP DNA.DIARRHEA STL QL NAA+PROBE: NEGATIVE
CHLORIDE SERPL-SCNC: 104 MMOL/L (ref 98–112)
CO2 SERPL-SCNC: 29 MMOL/L (ref 21–32)
CREAT BLD-MCNC: 0.72 MG/DL (ref 0.55–1.02)
CRYPTOSP AG STL QL IA: NEGATIVE
CRYPTOSP DNA SPEC QL NAA+PROBE: NEGATIVE
DEPRECATED RDW RBC AUTO: 43.3 FL (ref 35.1–46.3)
EAEC PAA PLAS AGGR+AATA ST NAA+NON-PRB: NEGATIVE
EC STX1+STX2 + H7 FLIC SPEC NAA+PROBE: NEGATIVE
EGFRCR SERPLBLD CKD-EPI 2021: 99 ML/MIN/1.73M2 (ref 60–?)
ENTAMOEBA HISTOLYTICA PCR: NEGATIVE
EOSINOPHIL # BLD: 7.4 X10(3) UL (ref 0–0.7)
EOSINOPHIL NFR BLD: 49 %
EPEC EAE GENE STL QL NAA+NON-PROBE: NEGATIVE
ERYTHROCYTE [DISTWIDTH] IN BLOOD BY AUTOMATED COUNT: 13 % (ref 11–15)
ETEC LTA+ST1A+ST1B TOX ST NAA+NON-PROBE: NEGATIVE
G LAMBLIA AG STL QL IA: NEGATIVE
GIARDIA LAMBLIA PCR: NEGATIVE
GLOBULIN PLAS-MCNC: 2.7 G/DL (ref 2–3.5)
GLUCOSE BLD-MCNC: 90 MG/DL (ref 70–99)
HCT VFR BLD AUTO: 36 % (ref 35–48)
HGB BLD-MCNC: 12.2 G/DL (ref 12–16)
LYMPHOCYTES NFR BLD: 15 %
LYMPHOCYTES NFR BLD: 2.27 X10(3) UL (ref 1–4)
MAGNESIUM SERPL-MCNC: 2.1 MG/DL (ref 1.6–2.6)
MCH RBC QN AUTO: 31 PG (ref 26–34)
MCHC RBC AUTO-ENTMCNC: 33.9 G/DL (ref 31–37)
MCV RBC AUTO: 91.4 FL (ref 80–100)
MONOCYTES # BLD: 1.06 X10(3) UL (ref 0.1–1)
MONOCYTES NFR BLD: 7 %
MORPHOLOGY: NORMAL
NEUTROPHILS # BLD AUTO: 2.53 X10 (3) UL (ref 1.5–7.7)
NEUTROPHILS NFR BLD: 29 %
NEUTS HYPERSEG # BLD: 4.38 X10(3) UL (ref 1.5–7.7)
NOROVIRUS GI/GII PCR: NEGATIVE
OSMOLALITY SERPL CALC.SUM OF ELEC: 290 MOSM/KG (ref 275–295)
P SHIGELLOIDES DNA STL QL NAA+PROBE: NEGATIVE
PHOSPHATE SERPL-MCNC: 3.2 MG/DL (ref 2.4–5.1)
PLATELET # BLD AUTO: 232 10(3)UL (ref 150–450)
PLATELET MORPHOLOGY: NORMAL
POTASSIUM SERPL-SCNC: 3.9 MMOL/L (ref 3.5–5.1)
PROT SERPL-MCNC: 6.7 G/DL (ref 5.7–8.2)
RBC # BLD AUTO: 3.94 X10(6)UL (ref 3.8–5.3)
ROTAVIRUS A PCR: NEGATIVE
SALMONELLA DNA SPEC QL NAA+PROBE: NEGATIVE
SAPOVIRUS PCR: NEGATIVE
SHIGELLA SP+EIEC IPAH ST NAA+NON-PROBE: NEGATIVE
SODIUM SERPL-SCNC: 141 MMOL/L (ref 136–145)
TOTAL CELLS COUNTED BLD: 100
V CHOLERAE DNA SPEC QL NAA+PROBE: NEGATIVE
VIBRIO DNA SPEC NAA+PROBE: NEGATIVE
WBC # BLD AUTO: 15.1 X10(3) UL (ref 4–11)
YERSINIA DNA SPEC NAA+PROBE: NEGATIVE

## 2025-08-24 PROCEDURE — 99223 1ST HOSP IP/OBS HIGH 75: CPT | Performed by: INTERNAL MEDICINE

## 2025-08-24 PROCEDURE — 99233 SBSQ HOSP IP/OBS HIGH 50: CPT | Performed by: HOSPITALIST

## 2025-08-24 RX ORDER — DIPHENOXYLATE HYDROCHLORIDE AND ATROPINE SULFATE 2.5; .025 MG/1; MG/1
1 TABLET ORAL 4 TIMES DAILY PRN
Status: DISCONTINUED | OUTPATIENT
Start: 2025-08-24 | End: 2025-08-26

## 2025-08-25 ENCOUNTER — TELEPHONE (OUTPATIENT)
Facility: CLINIC | Age: 55
End: 2025-08-25

## 2025-08-25 LAB
ALBUMIN SERPL-MCNC: 3.8 G/DL (ref 3.2–4.8)
ALBUMIN/GLOB SERPL: 1.5 (ref 1–2)
ALP LIVER SERPL-CCNC: 367 U/L (ref 41–108)
ALT SERPL-CCNC: 169 U/L (ref 10–49)
ANION GAP SERPL CALC-SCNC: 6 MMOL/L (ref 0–18)
AST SERPL-CCNC: 179 U/L (ref ?–34)
BILIRUB SERPL-MCNC: 0.7 MG/DL (ref 0.3–1.2)
BUN BLD-MCNC: 6 MG/DL (ref 9–23)
BUN/CREAT SERPL: 8.7 (ref 10–20)
CALCIUM BLD-MCNC: 9 MG/DL (ref 8.7–10.4)
CHLORIDE SERPL-SCNC: 110 MMOL/L (ref 98–112)
CO2 SERPL-SCNC: 28 MMOL/L (ref 21–32)
CREAT BLD-MCNC: 0.69 MG/DL (ref 0.55–1.02)
DEPRECATED RDW RBC AUTO: 45.1 FL (ref 35.1–46.3)
EGFRCR SERPLBLD CKD-EPI 2021: 102 ML/MIN/1.73M2 (ref 60–?)
ERYTHROCYTE [DISTWIDTH] IN BLOOD BY AUTOMATED COUNT: 13 % (ref 11–15)
GLOBULIN PLAS-MCNC: 2.6 G/DL (ref 2–3.5)
GLUCOSE BLD-MCNC: 85 MG/DL (ref 70–99)
HCT VFR BLD AUTO: 35.1 % (ref 35–48)
HGB BLD-MCNC: 11.4 G/DL (ref 12–16)
MCH RBC QN AUTO: 30.7 PG (ref 26–34)
MCHC RBC AUTO-ENTMCNC: 32.5 G/DL (ref 31–37)
MCV RBC AUTO: 94.6 FL (ref 80–100)
OSMOLALITY SERPL CALC.SUM OF ELEC: 295 MOSM/KG (ref 275–295)
PLATELET # BLD AUTO: 224 10(3)UL (ref 150–450)
POTASSIUM SERPL-SCNC: 4.3 MMOL/L (ref 3.5–5.1)
PROT SERPL-MCNC: 6.4 G/DL (ref 5.7–8.2)
RBC # BLD AUTO: 3.71 X10(6)UL (ref 3.8–5.3)
SODIUM SERPL-SCNC: 144 MMOL/L (ref 136–145)
TTG IGA SER-ACNC: 0.7 U/ML (ref ?–7)
WBC # BLD AUTO: 13.5 X10(3) UL (ref 4–11)

## 2025-08-25 PROCEDURE — 99232 SBSQ HOSP IP/OBS MODERATE 35: CPT | Performed by: REGISTERED NURSE

## 2025-08-25 PROCEDURE — 99233 SBSQ HOSP IP/OBS HIGH 50: CPT | Performed by: HOSPITALIST

## 2025-08-26 ENCOUNTER — TELEPHONE (OUTPATIENT)
Facility: CLINIC | Age: 55
End: 2025-08-26

## 2025-08-26 ENCOUNTER — TELEPHONE (OUTPATIENT)
Dept: SURGERY | Facility: CLINIC | Age: 55
End: 2025-08-26

## 2025-08-26 VITALS
TEMPERATURE: 99 F | DIASTOLIC BLOOD PRESSURE: 89 MMHG | OXYGEN SATURATION: 97 % | RESPIRATION RATE: 18 BRPM | WEIGHT: 153.38 LBS | HEART RATE: 70 BPM | SYSTOLIC BLOOD PRESSURE: 104 MMHG | BODY MASS INDEX: 27 KG/M2

## 2025-08-26 DIAGNOSIS — Z15.09 BRCA2 GENE MUTATION POSITIVE: Primary | ICD-10-CM

## 2025-08-26 DIAGNOSIS — Z15.01 BRCA2 GENE MUTATION POSITIVE: Primary | ICD-10-CM

## 2025-08-26 LAB
ALBUMIN SERPL-MCNC: 4 G/DL (ref 3.2–4.8)
ALBUMIN/GLOB SERPL: 1.5 (ref 1–2)
ALP LIVER SERPL-CCNC: 380 U/L (ref 41–108)
ALT SERPL-CCNC: 148 U/L (ref 10–49)
ANION GAP SERPL CALC-SCNC: 4 MMOL/L (ref 0–18)
AST SERPL-CCNC: 157 U/L (ref ?–34)
BILIRUB SERPL-MCNC: 0.6 MG/DL (ref 0.3–1.2)
BUN BLD-MCNC: 6 MG/DL (ref 9–23)
BUN/CREAT SERPL: 8.5 (ref 10–20)
CALCIUM BLD-MCNC: 9.1 MG/DL (ref 8.7–10.4)
CALPROTECTIN STL-MCNT: 70.8 ΜG/G (ref ?–50)
CHLORIDE SERPL-SCNC: 106 MMOL/L (ref 98–112)
CO2 SERPL-SCNC: 31 MMOL/L (ref 21–32)
CREAT BLD-MCNC: 0.71 MG/DL (ref 0.55–1.02)
DEPRECATED RDW RBC AUTO: 45.1 FL (ref 35.1–46.3)
EGFRCR SERPLBLD CKD-EPI 2021: 100 ML/MIN/1.73M2 (ref 60–?)
ERYTHROCYTE [DISTWIDTH] IN BLOOD BY AUTOMATED COUNT: 13.2 % (ref 11–15)
GLOBULIN PLAS-MCNC: 2.7 G/DL (ref 2–3.5)
GLUCOSE BLD-MCNC: 88 MG/DL (ref 70–99)
HCT VFR BLD AUTO: 35.8 % (ref 35–48)
HGB BLD-MCNC: 11.8 G/DL (ref 12–16)
MCH RBC QN AUTO: 30.7 PG (ref 26–34)
MCHC RBC AUTO-ENTMCNC: 33 G/DL (ref 31–37)
MCV RBC AUTO: 93.2 FL (ref 80–100)
OSMOLALITY SERPL CALC.SUM OF ELEC: 289 MOSM/KG (ref 275–295)
PLATELET # BLD AUTO: 235 10(3)UL (ref 150–450)
POTASSIUM SERPL-SCNC: 4.4 MMOL/L (ref 3.5–5.1)
PROT SERPL-MCNC: 6.7 G/DL (ref 5.7–8.2)
RBC # BLD AUTO: 3.84 X10(6)UL (ref 3.8–5.3)
SODIUM SERPL-SCNC: 141 MMOL/L (ref 136–145)
WBC # BLD AUTO: 11.3 X10(3) UL (ref 4–11)

## 2025-08-26 PROCEDURE — 99239 HOSP IP/OBS DSCHRG MGMT >30: CPT | Performed by: HOSPITALIST

## 2025-08-26 PROCEDURE — 99232 SBSQ HOSP IP/OBS MODERATE 35: CPT | Performed by: PHYSICIAN ASSISTANT

## 2025-08-26 RX ORDER — DIPHENOXYLATE HYDROCHLORIDE AND ATROPINE SULFATE 2.5; .025 MG/1; MG/1
1 TABLET ORAL 4 TIMES DAILY PRN
Qty: 20 TABLET | Refills: 0 | Status: SHIPPED | OUTPATIENT
Start: 2025-08-26

## 2025-08-27 ENCOUNTER — PATIENT OUTREACH (OUTPATIENT)
Dept: CASE MANAGEMENT | Age: 55
End: 2025-08-27

## 2025-08-27 LAB — PANCREATIC ELAST FECAL: 107 UG ELAST./G

## 2025-08-29 ENCOUNTER — TELEPHONE (OUTPATIENT)
Dept: OTHER | Age: 55
End: 2025-08-29

## (undated) DIAGNOSIS — E78.00 HYPERCHOLESTEROLEMIA: Primary | ICD-10-CM

## (undated) DEVICE — KIT MFLD FOR SPEC COLL

## (undated) DEVICE — TUBING SCT CLR 6FT .25IN MDVC

## (undated) DEVICE — Device

## (undated) DEVICE — KIT CLEAN ENDOKIT 1.1OZ GOWNX2

## (undated) DEVICE — KIT ENDO ORCAPOD 160/180/190

## (undated) NOTE — LETTER
[address] [place] [address]  Authorization for Surgical Operation or Procedure  Date: ______________       Time: _______________  1.  I hereby authorize Dr. Marizol Briones, my physician and the assistant, to perform the following operation an 5. I consent to the photographing of the operations or procedures to be performed for the purposes of advancing medicine, science, and/or education, provided my identity is not revealed.  If the procedure has been videotaped, the physician/surgeon will obta risks and benefits involved in the proposed treatment and any reasonable alternative to the proposed treatment. I have also explained the risks and benefits involved in the refusal of the proposed treatment and have answered the patient's questions.  If I h

## (undated) NOTE — LETTER
[facility], [address] [facility], [address] [address]  Dept: [phone]      Re: Loly Braswell-- [place]      January 15, 2020    To Whom It May Concern,    I am the primary care physician fo

## (undated) NOTE — LETTER
[address] [place] [address]  Authorization for Surgical Operation or Procedure  Date: ______________       Time: _______________  1.  I hereby authorize Dr. Katrina Oliveira , my physician and the assistant, to perform the following operation a 5. I consent to the photographing of the operations or procedures to be performed for the purposes of advancing medicine, science, and/or education, provided my identity is not revealed.  If the procedure has been videotaped, the physician/surgeon will obta risks and benefits involved in the proposed treatment and any reasonable alternative to the proposed treatment. I have also explained the risks and benefits involved in the refusal of the proposed treatment and have answered the patient's questions.  If I h

## (undated) NOTE — LETTER
Date & Time: 2/2/2022, 8:56 AM  Patient: Dyan Neff  Encounter Provider(s):    MANUEL Davey       To Whom It May Concern:    Ivis Hernandez was seen and treated in our department on 2/2/2022. She should not return to work until 02/04/2022. If you have any questions or concerns, please do not hesitate to call.       ANGELA Wood  _____________________________  [id number] Signature

## (undated) NOTE — LETTER
[facility] [place] [address] [facility] [address]  Dept: [phone]  Dept Fax: [phone]  Loc: [phone]      March 10, 2018    Patient: Mike Beltran   Date of Visit: 3/10/2018       To Whom It May Concern:    Nikolai